# Patient Record
Sex: MALE | Race: WHITE | Employment: OTHER | URBAN - METROPOLITAN AREA
[De-identification: names, ages, dates, MRNs, and addresses within clinical notes are randomized per-mention and may not be internally consistent; named-entity substitution may affect disease eponyms.]

---

## 2019-11-19 ENCOUNTER — HOME HEALTH ADMISSION (OUTPATIENT)
Dept: HOME HEALTH SERVICES | Facility: HOME HEALTH | Age: 54
End: 2019-11-19
Payer: MEDICARE

## 2019-11-19 ENCOUNTER — HOSPITAL ENCOUNTER (OUTPATIENT)
Dept: PHYSICAL THERAPY | Age: 54
Discharge: HOME | End: 2019-11-19
Payer: MEDICARE

## 2019-11-19 ENCOUNTER — HOSPITAL ENCOUNTER (OUTPATIENT)
Dept: SURGERY | Age: 54
Discharge: HOME | End: 2019-11-19
Payer: MEDICARE

## 2019-11-19 LAB
ANION GAP SERPL CALC-SCNC: 2 MMOL/L (ref 7–16)
APTT PPP: 29.7 SEC (ref 24.7–39.8)
BACTERIA SPEC CULT: ABNORMAL
BASOPHILS # BLD: 0.1 K/UL (ref 0–0.2)
BASOPHILS NFR BLD: 1 % (ref 0–2)
BUN SERPL-MCNC: 22 MG/DL (ref 6–23)
CALCIUM SERPL-MCNC: 9.5 MG/DL (ref 8.3–10.4)
CHLORIDE SERPL-SCNC: 104 MMOL/L (ref 98–107)
CO2 SERPL-SCNC: 33 MMOL/L (ref 21–32)
CREAT SERPL-MCNC: 0.83 MG/DL (ref 0.8–1.5)
DIFFERENTIAL METHOD BLD: NORMAL
EOSINOPHIL # BLD: 0.3 K/UL (ref 0–0.8)
EOSINOPHIL NFR BLD: 5 % (ref 0.5–7.8)
ERYTHROCYTE [DISTWIDTH] IN BLOOD BY AUTOMATED COUNT: 13.1 % (ref 11.9–14.6)
EST. AVERAGE GLUCOSE BLD GHB EST-MCNC: 117 MG/DL
GLUCOSE SERPL-MCNC: 102 MG/DL (ref 65–100)
HBA1C MFR BLD: 5.7 %
HCT VFR BLD AUTO: 44.6 % (ref 41.1–50.3)
HGB BLD-MCNC: 14.3 G/DL (ref 13.6–17.2)
IMM GRANULOCYTES # BLD AUTO: 0 K/UL (ref 0–0.5)
IMM GRANULOCYTES NFR BLD AUTO: 0 % (ref 0–5)
INR PPP: 0.9
LYMPHOCYTES # BLD: 2.2 K/UL (ref 0.5–4.6)
LYMPHOCYTES NFR BLD: 33 % (ref 13–44)
MCH RBC QN AUTO: 28.7 PG (ref 26.1–32.9)
MCHC RBC AUTO-ENTMCNC: 32.1 G/DL (ref 31.4–35)
MCV RBC AUTO: 89.4 FL (ref 79.6–97.8)
MONOCYTES # BLD: 0.6 K/UL (ref 0.1–1.3)
MONOCYTES NFR BLD: 8 % (ref 4–12)
NEUTS SEG # BLD: 3.5 K/UL (ref 1.7–8.2)
NEUTS SEG NFR BLD: 52 % (ref 43–78)
NRBC # BLD: 0 K/UL (ref 0–0.2)
PLATELET # BLD AUTO: 205 K/UL (ref 150–450)
PMV BLD AUTO: 10.9 FL (ref 9.4–12.3)
POTASSIUM SERPL-SCNC: 4.4 MMOL/L (ref 3.5–5.1)
PROTHROMBIN TIME: 12.1 SEC (ref 11.7–14.5)
RBC # BLD AUTO: 4.99 M/UL (ref 4.23–5.6)
SERVICE CMNT-IMP: ABNORMAL
SODIUM SERPL-SCNC: 139 MMOL/L (ref 136–145)
WBC # BLD AUTO: 6.7 K/UL (ref 4.3–11.1)

## 2019-11-19 PROCEDURE — 85730 THROMBOPLASTIN TIME PARTIAL: CPT

## 2019-11-19 PROCEDURE — 85610 PROTHROMBIN TIME: CPT

## 2019-11-19 PROCEDURE — 85025 COMPLETE CBC W/AUTO DIFF WBC: CPT

## 2019-11-19 PROCEDURE — 80048 BASIC METABOLIC PNL TOTAL CA: CPT

## 2019-11-19 PROCEDURE — 97161 PT EVAL LOW COMPLEX 20 MIN: CPT

## 2019-11-19 PROCEDURE — 36415 COLL VENOUS BLD VENIPUNCTURE: CPT

## 2019-11-19 PROCEDURE — 87641 MR-STAPH DNA AMP PROBE: CPT

## 2019-11-19 PROCEDURE — 83036 HEMOGLOBIN GLYCOSYLATED A1C: CPT

## 2019-11-19 PROCEDURE — 77030027138 HC INCENT SPIROMETER -A

## 2019-11-19 RX ORDER — METHADONE HYDROCHLORIDE 10 MG/1
10 TABLET ORAL 3 TIMES DAILY
COMMUNITY

## 2019-11-19 RX ORDER — CELECOXIB 200 MG/1
200 CAPSULE ORAL DAILY
COMMUNITY

## 2019-11-19 NOTE — PERIOP NOTES
Patient verified name and . Order for consent  found in EHR and matches case posting; patient verified. Anesthesia review EKG;  bradycardia. Pt reports hx of lumbar fusion and revision in , : reports he has x-rays and will bring for anesthesiologist to review on DOS. Type 3 surgery, Joint assessment complete. Labs per surgeon: cbc,bmp,pt,ptt ; results within anesthesia limits; mssa pending. Labs per anesthesia protocol: no other labs needed. EKG: dated 19: anesthesia to review along with  prior ekg dated 2018; also requested most recent office note of PCP for review. MRSA/MSSA swab collected; pharmacy to review and dose antibiotic as appropriate. Hospital approved surgical skin cleanser and instructions to return bottle on DOS given per hospital policy. Patient provided with handouts including Guide to Surgery, Pain Management, Hand Hygiene, Blood Transfusion Education, and [place] [facility] Brochure. Patient answered medical/surgical history questions at their best of ability. All prior to admission medications documented in [facility]. Original medication prescription bottle not visualized during patient appointment. Patient given copy of  medication record with instructions-see prior NN. Patient teach back successful and patient demonstrates knowledge of instruction.

## 2019-11-19 NOTE — PERIOP NOTES
Received from PCP: EKGs  dated 6-6-19 and 4-23-18 along with office note dated 6-6-19: anesthesia review.

## 2019-11-19 NOTE — PROGRESS NOTES
Lucita Erwin  : [birth year](67 y.o.) [address] at [place] [address].  [phone]       Physical Therapy Prehab Plan of Treatment and Evaluation Summary:2019    ICD-10: Treatment Diagnosis:   · Pain in Right Knee (M25.561)  · Stiffness of Right Knee, Not elsewhere classified (M25.661)  Precautions/Allergies:   Patient has no allergy information on record. MEDICAL/REFERRING DIAGNOSIS:  Unilateral primary osteoarthritis, right knee [M17.11]  REFERRING PHYSICIAN: Ethan Soares MD  DATE OF SURGERY: 19    Assessment:   Comments:  He is here alone. He lpans on going to his girlfriend's house at [facility] for her support. He should do well. Very motivated. PROBLEM LIST (Impacting functional limitations):  Mr. Sharron Cameron presents with the following right lower extremity(s) problems:  1. Strength  2. Range of Motion  3. Home Exercise Program  4. Pain   INTERVENTIONS PLANNED:  1. Home Exercise Program  2. Educational Discussion      TREATMENT PLAN: Effective Dates: 2019 TO 2019. Frequency/Duration: Patient to continue to perform home exercise program at least twice per day up until his surgery. GOALS: (Goals have been discussed and agreed upon with patient.)  Discharge Goals: Time Frame: 1 Day  1. Patient will demonstrate independence with a home exercise program designed to increase strength, range of motion and pain control to minimize functional deficits and optimize patient for total joint replacement. Rehabilitation Potential For Stated Goals: Good  Regarding Nazia Vazquez's therapy, I certify that the treatment plan above will be carried out by a therapist or under their direction.   Thank you for this referral,  Sarah Oshea, PT               HISTORY:   Present Symptoms:  Pain Intensity 1: 8(at its worst)  Pain Location 1: Knee  Pain Orientation 1: Right, Medial, Anterior   History of Present Injury/Illness (Reason for Referral):  Medical/Referring Diagnosis: Unilateral primary osteoarthritis, right knee [M17.11]   Past Medical History/Comorbidities:   Mr. Betty Bradford  has no past medical history on file. Mr. Betty Bradford  has no past surgical history on file. Social History/Living Environment:   Home Environment: Private residence  # Steps to Enter: 3  Rails to Enter: No  One/Two Story Residence: One story  Living Alone: Yes  Support Systems: (girlfriend)  Patient Expects to be Discharged to[de-identified] Private residence  Current DME Used/Available at Home: Francisco J Barer, straight, Walker, rolling, Shower chair, Commode, bedside  Tub or Shower Type: Shower  Work/Activity:  disabled  Dominant Side:  LEFT  Current Medications:  See Pre-assessment nursing note   Number of Personal Factors/Comorbidities that affect the Plan of Care: 1-2: MODERATE COMPLEXITY   EXAMINATION:   ADLs (Current Functional Status):   Ambulation:  [x] Independent  [] Walk Indoors Only  [x] Walk Outdoors  [] Use Assistive Device  [] Use Wheelchair Only Dressing:  [x] Independent  Requires Assistance from Someone for:  [] Sock/Shoes  [] Pants  [] Everything   Bathing/Showering:   [x] Independent  [] Requires Assistance from Someone  [] [address] Only Household Activities:  [x] Routine house and yard work  [] Light Housework Only  [] None   Observation/Orthostatic Postural Assessment:    Genu varus left, Genu varus right, Leg length discrepancy, right(R LE 1/4\" shorter than L)  ROM/Flexibility:   AROM: Within functional limits(L LE)                       RLE AROM  R Knee Flexion: 112  R Knee Extension: 10   Strength:   Strength:  Within functional limits(L LE)              RLE Strength  R Hip Flexion: 5  R Knee Extension: 4  R Ankle Dorsiflexion: 5   Functional Mobility:    Sensation: Intact(L LE)    Stand to Sit: Independent  Sit to Stand: Independent  Stand Pivot Transfers: Independent  Distance (ft): 1000 Feet (ft)  Ambulation - Level of Assistance: Independent  Speed/Jordana: City[employer] decreased (<100 feet/min)  Gait Abnormalities: Antalgic          Balance:    Sitting: Intact  Standing: Intact   Body Structures Involved:  1. Bones  2. Joints  3. Muscles Body Functions Affected:  1. Neuromusculoskeletal  2. Movement Related Activities and Participation Affected:  1. General Tasks and Demands  2. Mobility   Number of elements that affect the Plan of Care: 4+: HIGH COMPLEXITY   CLINICAL PRESENTATION:   Presentation: Stable and uncomplicated: LOW COMPLEXITY   CLINICAL DECISION MAKING:   Outcome Measure: Tool Used: Lower Extremity Functional Scale (LEFS)  Score:  Initial: 52/80 Most Recent: X/80 (Date: -- )   Interpretation of Score: 20 questions each scored on a 5 point scale with 0 representing \"extreme difficulty or unable to perform\" and 4 representing \"no difficulty\". The lower the score, the greater the functional disability. 80/80 represents no disability. Minimal detectable change is 9 points. Medical Necessity:   · Mr. Angel Garcia is expected to optimize his lower extremity strength and ROM in preparation for joint replacement surgery. Reason for Services/Other Comments:  · Achieve baseline assesment of musculoskeletal system, functional mobility and home environment. , educate in PT HEP in preparation for surgery, educate in hospital plan of care. Use of outcome tool(s) and clinical judgement create a POC that gives a: Clear prediction of patient's progress: LOW COMPLEXITY   TREATMENT:   Treatment/Session Assessment:  Patient was instructed in PT- HEP to increase strength and ROM in LEs. Answered all questions. · Post session pain:  2  · Compliance with Program/Exercises: compliant most of the time.   Total Treatment Duration:  PT Patient Time In/Time Out  Time In: 0800  Time Out: 0815    Austin Roberson PT

## 2019-11-19 NOTE — PERIOP NOTES
PLEASE CONTINUE TAKING ALL PRESCRIPTION MEDICATIONS UP TO THE DAY OF SURGERY. DISCONTINUE all vitamins and supplements 7 days prior to surgery. Home Medications to take  the day of surgery      Methadone           Home Medications   to Hold     NONE           Comments                Please do not bring home medications with you on the day of surgery unless otherwise directed by your nurse. If you are instructed to bring home medications, please give them to your nurse as they will be administered by the nursing staff. If you have any questions, [facility] [phone] or [facility] [phone].

## 2019-11-20 VITALS
SYSTOLIC BLOOD PRESSURE: 152 MMHG | OXYGEN SATURATION: 100 % | HEIGHT: 69 IN | RESPIRATION RATE: 18 BRPM | BODY MASS INDEX: 26.36 KG/M2 | TEMPERATURE: 97.4 F | HEART RATE: 44 BPM | WEIGHT: 178 LBS | DIASTOLIC BLOOD PRESSURE: 95 MMHG

## 2019-11-20 NOTE — PERIOP NOTES
Dr. Jesusita Flores, anesthesia, reviewed and ok'd for surgery ekg's 6/6/19, 4/23/18 and pc note 6/6/19.

## 2019-11-20 NOTE — PROGRESS NOTES
19 0730   Oxygen Therapy   O2 Sat (%) 98 %   Pulse via Oximetry 58 beats per minute   O2 Device Room air   Pre-Treatment   Breath Sounds Bilateral Clear   Pre FEV1 (liters) 3.4 liters   % Predicted 104   Incentive Spirometry Treatment   Actual Volume (ml) 4000 ml     Initial respiratory Assessment completed with pt. Pt was interviewed and evaluated in Joint camp prior to surgery. Patient ID:  Mohan Hernandez  [id number]  71 y.o.  1965  Surgeon: Dr. Shayla Dodge  Date of Surgery: 2019  Procedure: Total Right Knee Arthroplasty  Primary Care Physician: Hollis Collazo [phone]  Specialists:                                  Pt instructed in the use of Incentive Spirometry. Pt instructed to bring Incentive Spirometer back on date of surgery & to start using Is upon return to pt room.     Pt taught proper cough technique    History of smokin/2 PPD FOR 30 YEARS                                                      Quit date:       10 YEARS AGO    Secondhand smoke:FATHER      Past procedures with Oxygen desaturation:DENIES    Past Medical History:   Diagnosis Date    Arthritis     Bradycardia     pt reports his heart rate is usually in the 40s    Chronic pain     s/p back fusion, infection, and and revision  -pt sees pain management physician for control    Kidney stones              PT STATES HE TAKES METHADONE TID FOR PAST 20 YEARS FOR PAIN                                                                                                                                        Respiratory history:DENIES SOB                                                                  Respiratory meds:  DENIES                                       FAMILY PRESENT:                                                             NO                                        PAST SLEEP STUDY:                     DENIES  HX OF CAROLINA:                                      DENIES                                     CAROLINA assessment:                                               SLEEPS ON SIDE       &      BACK                                                         PHYSICAL EXAM   Body mass index is 26.29 kg/m².    Visit Vitals  BP (!) 152/95 (BP 1 Location: Right arm, BP Patient Position: At rest)   Pulse (!) 44   Temp 97.4 °F (36.3 °C)   Resp 18   Ht 5' 9\" (1.753 m)   Wt 80.7 kg (178 lb)   SpO2 100%   BMI 26.29 kg/m²     Neck circumference: 39.5     cm    Loud snoring:        SNORES                                Witnessed apnea or wakening gasping or choking:,           PT STATES POSSIBLE APNEA                                                                                             WAKES UP CHOKED    Awakens with headaches:                                                  DENIES    Morning or daytime tiredness/ sleepiness:                                                                                                         TIRED   Dry mouth or sore throat in morning:                YES                                                                          Granados stage:  3    SACS score:      Stop Bang   STOP-BANG  Does the patient snore loudly (louder than talking or loud enough to be heard through closed doors)?: No  Does the patient often feel tired, fatigued, or sleepy during the daytime, even after a \"good\" night's sleep?: Yes  Has anyone ever observed the patient stop breathing during their sleep? : Yes  Does the patient have or are they being treated for high blood pressure?: No  Is the patient's BMI greater than 35?: No  Is your neck circumference greater than 17 inches (Male) or 16 inches (Female)?: No  Is the patient older than 48?: Yes  Is the patient male?: Yes  CAROLINA Score: 4  Has the patient been referred to Sleep Medicine?: No  Has the patient previously been diagnosed with Obstructive Sleep Apnea?: No                            CPAP:                  NONE               CONT SAT HS Referrals:  DECLINED  Pt.  Phone Number:

## 2019-12-03 NOTE — ADVANCED PRACTICE NURSE
Total Joint Surgery Preoperative Chart Review      Patient ID:  Vivian Frazier  [id number]  10 y.o.  1965  Surgeon: Dr. Bright Torres  Date of Surgery: 12/9/2019  Procedure: Total Right Knee Arthroplasty  Primary Care Physician: Abilio Marcum [phone]  Specialty Physician(s):      Subjective:   Vivian Frazier is a 62 y.o. WHITE OR  male who presents for preoperative evaluation for Total Right Knee arthroplasty. This is a preoperative chart review note based on data collected by the nurse at the surgical Pre-Assessment visit. Past Medical History:   Diagnosis Date    Arthritis     Bradycardia     pt reports his heart rate is usually in the 40s    Chronic pain     s/p back fusion, infection, and and revision  -pt sees pain management physician for control    Kidney stones       Past Surgical History:   Procedure Laterality Date    HX KNEE ARTHROSCOPY  2014    meniscus repair bilateral    HX KNEE REPLACEMENT Left 2016    HX OTHER SURGICAL  2000    spine fusion    HX OTHER SURGICAL  2001    revision spine fusion     History reviewed. No pertinent family history. Social History     Tobacco Use    Smoking status: Former Smoker    Smokeless tobacco: Never Used   Substance Use Topics    Alcohol use: Never     Frequency: Never       Prior to Admission medications    Medication Sig Start Date End Date Taking? Authorizing Provider   celecoxib (CELEBREX) 200 mg capsule Take  by mouth daily. Yes Provider, Historical   methadone (DOLOPHINE) 10 mg tablet Take  by mouth three (3) times daily. Indications: excessive pain   Yes Provider, Historical   ascorbic acid,sod/zinc ox,gluc (ZINC AND C PO) Take  by mouth daily. Yes Provider, Historical   MAGNESIUM CARBONATE by Does Not Apply route daily. Yes Provider, Historical   docusate sodium (STOOL SOFTENER PO) Take  by mouth as needed.    Yes Provider, Historical     No Known Allergies       Objective:     Physical Exam:   No data found.    ECG:    EKG Results     None          Data Review:   Labs:     Results for Luz Reynaga (MRN [id number]) as of 12/3/2019 14:44   Ref.  Range 11/19/2019 07:30   Sodium Latest Ref Range: 136 - 145 mmol/L 139   Potassium Latest Ref Range: 3.5 - 5.1 mmol/L 4.4   Chloride Latest Ref Range: 98 - 107 mmol/L 104   CO2 Latest Ref Range: 21 - 32 mmol/L 33 (H)   Anion gap Latest Ref Range: 7 - 16 mmol/L 2 (L)   Glucose Latest Ref Range: 65 - 100 mg/dL 102 (H)   BUN Latest Ref Range: 6 - 23 MG/DL 22   Creatinine Latest Ref Range: 0.8 - 1.5 MG/DL 0.83   Calcium Latest Ref Range: 8.3 - 10.4 MG/DL 9.5   GFR est non-AA Latest Ref Range: >60 ml/min/1.73m2 >60   GFR est AA Latest Ref Range: >60 ml/min/1.73m2 >60   Hemoglobin A1c, (calculated) Latest Units: % 5.7   Est. average glucose Latest Units: mg/dL 117       Problem List:  )    Total Joint Surgery Pre-Assessment Recommendations:           None    Signed By: SILVIANO Velasquez    December 3, 2019

## 2019-12-03 NOTE — H&P
[address] [facility]  Pre Operative History and Physical Exam    Patient ID:  Tari Garcia  [id number]  17 y.o.  1965    Today: December 3, 2019       Assessment:   1. Arthritis of the right knee  2. Recommendation for post op pain per pain management:  Oxycodone 10-15mg tablets one every 4-6 hours for 5-7 days. Keep taking methadone for chronic pain. Plan for 7 days depending on normal dosing schedule for our post op protocol. Plan:    1. Proceed with scheduled Procedure(s) (LRB):  RIGHT KNEE ARTHROPLASTY TOTAL / FNB / GISELA (Right)            CC:  Right knee pain    HPI:   The patient has end stage arthritis of the right knee. The patient was evaluated and examined during a consultation prior to this office visit. There have been no changes to the patient's orthopedic condition since the initial consultation. The patient has failed previous conservative treatment for this condition including antiinflammatories , and lifestyle modifications. The necessity for joint replacement is present.  The patient will be admitted the day of surgery for Procedure(s) (LRB):  RIGHT KNEE ARTHROPLASTY TOTAL / FNB / GISELA (Right)      Past Medical/Surgical History:  Past Medical History:   Diagnosis Date    Arthritis     Bradycardia     pt reports his heart rate is usually in the 40s    Chronic pain     s/p back fusion, infection, and and revision  -pt sees pain management physician for control    Kidney stones      Past Surgical History:   Procedure Laterality Date    HX KNEE ARTHROSCOPY  2014    meniscus repair bilateral    HX KNEE REPLACEMENT Left 2016    HX OTHER SURGICAL  2000    spine fusion    HX OTHER SURGICAL  2001    revision spine fusion        Allergies: No Known Allergies     Physical Exam:   General: NAD, Alert, Oriented, Appears their stated age     [de-identified]: NC/AT, PERRL    Skin: No rashes, lesions or wounds seen      Psych: normal affect      Heart: Regular Rate, Rhythm     Lungs: unlabored respirations, normal breath sounds     Abdomen: Soft and non-distended     Ortho: Pain with limited ROM of the right knee    Neuro: no focal defects, sensation is equal bilaterally     Lymph: no lymphadenopathy     Meds:   No current facility-administered medications for this encounter. Current Outpatient Medications   Medication Sig    celecoxib (CELEBREX) 200 mg capsule Take  by mouth daily.  methadone (DOLOPHINE) 10 mg tablet Take  by mouth three (3) times daily. Indications: excessive pain    ascorbic acid,sod/zinc ox,gluc (ZINC AND C PO) Take  by mouth daily.  MAGNESIUM CARBONATE by Does Not Apply route daily.  docusate sodium (STOOL SOFTENER PO) Take  by mouth as needed. Labs:  Hospital Outpatient Visit on 11/19/2019   Component Date Value Ref Range Status    WBC 11/19/2019 6.7  4.3 - 11.1 K/uL Final    RBC 11/19/2019 4.99  4.23 - 5.6 M/uL Final    HGB 11/19/2019 14.3  13.6 - 17.2 g/dL Final    HCT 11/19/2019 44.6  41.1 - 50.3 % Final    MCV 11/19/2019 89.4  79.6 - 97.8 FL Final    MCH 11/19/2019 28.7  26.1 - 32.9 PG Final    MCHC 11/19/2019 32.1  31.4 - 35.0 g/dL Final    RDW 11/19/2019 13.1  11.9 - 14.6 % Final    PLATELET 83/20/6371 499  150 - 450 K/uL Final    MPV 11/19/2019 10.9  9.4 - 12.3 FL Final    ABSOLUTE NRBC 11/19/2019 0.00  0.0 - 0.2 K/uL Final    **Note: Absolute NRBC parameter is now reported with Hemogram**    DF 11/19/2019 AUTOMATED    Final    NEUTROPHILS 11/19/2019 52  43 - 78 % Final    LYMPHOCYTES 11/19/2019 33  13 - 44 % Final    MONOCYTES 11/19/2019 8  4.0 - 12.0 % Final    EOSINOPHILS 11/19/2019 5  0.5 - 7.8 % Final    BASOPHILS 11/19/2019 1  0.0 - 2.0 % Final    IMMATURE GRANULOCYTES 11/19/2019 0  0.0 - 5.0 % Final    ABS. NEUTROPHILS 11/19/2019 3.5  1.7 - 8.2 K/UL Final    ABS. LYMPHOCYTES 11/19/2019 2.2  0.5 - 4.6 K/UL Final    ABS. MONOCYTES 11/19/2019 0.6  0.1 - 1.3 K/UL Final    ABS.  EOSINOPHILS 11/19/2019 0.3  0.0 - 0.8 K/UL Final    ABS. BASOPHILS 11/19/2019 0.1  0.0 - 0.2 K/UL Final    ABS. IMM. GRANS. 11/19/2019 0.0  0.0 - 0.5 K/UL Final    Hemoglobin A1c 11/19/2019 5.7  % Final    Est. average glucose 11/19/2019 117  mg/dL Final    Comment: (NOTE)  The eAG should be interpreted with patient characteristics in mind   since ethnicity, interindividual differences, red cell lifespan,   variation in rates of glycation, etc. may affect the validity of the   calculation.  Sodium 11/19/2019 139  136 - 145 mmol/L Final    Potassium 11/19/2019 4.4  3.5 - 5.1 mmol/L Final    Chloride 11/19/2019 104  98 - 107 mmol/L Final    CO2 11/19/2019 33* 21 - 32 mmol/L Final    Anion gap 11/19/2019 2* 7 - 16 mmol/L Final    Glucose 11/19/2019 102* 65 - 100 mg/dL Final    Comment: 47 - 60 mg/dl Consistent with, but not fully diagnostic of hypoglycemia. 101 - 125 mg/dl Impaired fasting glucose/consistent with pre-diabetes mellitus  > 126 mg/dl Fasting glucose consistent with overt diabetes mellitus      BUN 11/19/2019 22  6 - 23 MG/DL Final    Creatinine 11/19/2019 0.83  0.8 - 1.5 MG/DL Final    GFR est AA 11/19/2019 >60  >60 ml/min/1.73m2 Final    GFR est non-AA 11/19/2019 >60  >60 ml/min/1.73m2 Final    Comment: (NOTE)  Estimated GFR is calculated using the Modification of Diet in Renal   Disease (MDRD) Study equation, reported for both  Americans   (GFRAA) and non- Americans (GFRNA), and normalized to 1.73m2   body surface area. The physician must decide which value applies to   the patient. The MDRD study equation should only be used in   individuals age 25 or older. It has not been validated for the   following: pregnant women, patients with serious comorbid conditions,   or on certain medications, or persons with extremes of body size,   muscle mass, or nutritional status.       Calcium 11/19/2019 9.5  8.3 - 10.4 MG/DL Final    Special Requests: 11/19/2019 NASAL    Final    Culture result: 11/19/2019 MRSA target DNA not detected, SA target DNA detected. A MRSA negative, SA positive test result does not preclude MRSA nasal colonization. *   Final    Prothrombin time 11/19/2019 12.1  11.7 - 14.5 sec Final    INR 11/19/2019 0.9    Final    Comment: Suggested therapeutic INR range:  Venous thrombosis and embolus  2.0-3.0  Prosthetic heart valve         2.5-3.5  ** Note new reference range and method **      aPTT 11/19/2019 29.7  24.7 - 39.8 SEC Final    Comment: Heparin Therapeutic Range = 74 - 123 seconds  In addition to factor deficiency, monitoring heparin therapy, etc., evaluation of a prolonged aPTT result should include consideration of preanalytic variables such as heparin flush contamination, specimen integrity issues, etc.                   There is no problem list on file for this patient.         Signed By: Charleen Yuen NP  December 3, 2019

## 2019-12-08 ENCOUNTER — ANESTHESIA EVENT (OUTPATIENT)
Dept: SURGERY | Age: 54
DRG: 470 | End: 2019-12-08
Payer: MEDICARE

## 2019-12-09 ENCOUNTER — ANESTHESIA (OUTPATIENT)
Dept: SURGERY | Age: 54
DRG: 470 | End: 2019-12-09
Payer: MEDICARE

## 2019-12-09 ENCOUNTER — HOSPITAL ENCOUNTER (INPATIENT)
Age: 54
LOS: 1 days | Discharge: HOME HEALTH | DRG: 470 | End: 2019-12-10
Attending: ORTHOPAEDIC SURGERY | Admitting: ORTHOPAEDIC SURGERY
Payer: MEDICARE

## 2019-12-09 DIAGNOSIS — Z96.651 STATUS POST TOTAL KNEE REPLACEMENT, RIGHT: Primary | ICD-10-CM

## 2019-12-09 PROBLEM — M17.11 OSTEOARTHRITIS OF RIGHT KNEE: Status: ACTIVE | Noted: 2019-12-09

## 2019-12-09 PROBLEM — M19.90 OSTEOARTHRITIS: Status: ACTIVE | Noted: 2019-12-09

## 2019-12-09 LAB
ATRIAL RATE: 52 BPM
CALCULATED P AXIS, ECG09: 36 DEGREES
CALCULATED R AXIS, ECG10: 44 DEGREES
CALCULATED T AXIS, ECG11: 30 DEGREES
DIAGNOSIS, 93000: NORMAL
GLUCOSE BLD STRIP.AUTO-MCNC: 93 MG/DL (ref 65–100)
HGB BLD-MCNC: 12.1 G/DL (ref 13.6–17.2)
P-R INTERVAL, ECG05: 142 MS
Q-T INTERVAL, ECG07: 478 MS
QRS DURATION, ECG06: 100 MS
QTC CALCULATION (BEZET), ECG08: 444 MS
VENTRICULAR RATE, ECG03: 52 BPM

## 2019-12-09 PROCEDURE — 85018 HEMOGLOBIN: CPT

## 2019-12-09 PROCEDURE — 77030002966 HC SUT PDS J&J -A: Performed by: ORTHOPAEDIC SURGERY

## 2019-12-09 PROCEDURE — C1713 ANCHOR/SCREW BN/BN,TIS/BN: HCPCS | Performed by: ORTHOPAEDIC SURGERY

## 2019-12-09 PROCEDURE — 93005 ELECTROCARDIOGRAM TRACING: CPT | Performed by: HOSPITALIST

## 2019-12-09 PROCEDURE — 76210000016 HC OR PH I REC 1 TO 1.5 HR: Performed by: ORTHOPAEDIC SURGERY

## 2019-12-09 PROCEDURE — 65270000029 HC RM PRIVATE

## 2019-12-09 PROCEDURE — 74011250637 HC RX REV CODE- 250/637: Performed by: ORTHOPAEDIC SURGERY

## 2019-12-09 PROCEDURE — 8E0Y0CZ ROBOTIC ASSISTED PROCEDURE OF LOWER EXTREMITY, OPEN APPROACH: ICD-10-PCS | Performed by: ORTHOPAEDIC SURGERY

## 2019-12-09 PROCEDURE — 77030010507 HC ADH SKN DERMBND J&J -B: Performed by: ORTHOPAEDIC SURGERY

## 2019-12-09 PROCEDURE — 74011000250 HC RX REV CODE- 250: Performed by: NURSE ANESTHETIST, CERTIFIED REGISTERED

## 2019-12-09 PROCEDURE — 77030019557 HC ELECTRD VES SEAL MEDT -F: Performed by: ORTHOPAEDIC SURGERY

## 2019-12-09 PROCEDURE — 74011250637 HC RX REV CODE- 250/637: Performed by: PHYSICIAN ASSISTANT

## 2019-12-09 PROCEDURE — 82962 GLUCOSE BLOOD TEST: CPT

## 2019-12-09 PROCEDURE — 77030029820: Performed by: ORTHOPAEDIC SURGERY

## 2019-12-09 PROCEDURE — 74011250636 HC RX REV CODE- 250/636: Performed by: ORTHOPAEDIC SURGERY

## 2019-12-09 PROCEDURE — 77030009403 HC ELECTRD ENDO MEGA -B: Performed by: ORTHOPAEDIC SURGERY

## 2019-12-09 PROCEDURE — 77030018836 HC SOL IRR NACL ICUM -A: Performed by: ORTHOPAEDIC SURGERY

## 2019-12-09 PROCEDURE — 76942 ECHO GUIDE FOR BIOPSY: CPT | Performed by: ORTHOPAEDIC SURGERY

## 2019-12-09 PROCEDURE — 97110 THERAPEUTIC EXERCISES: CPT

## 2019-12-09 PROCEDURE — 74011250636 HC RX REV CODE- 250/636: Performed by: PHYSICIAN ASSISTANT

## 2019-12-09 PROCEDURE — 77030039425 HC BLD LARYNG TRULITE DISP TELE -A: Performed by: ANESTHESIOLOGY

## 2019-12-09 PROCEDURE — 97161 PT EVAL LOW COMPLEX 20 MIN: CPT

## 2019-12-09 PROCEDURE — 74011250636 HC RX REV CODE- 250/636: Performed by: ANESTHESIOLOGY

## 2019-12-09 PROCEDURE — 36415 COLL VENOUS BLD VENIPUNCTURE: CPT

## 2019-12-09 PROCEDURE — 76010000171 HC OR TIME 2 TO 2.5 HR INTENSV-TIER 1: Performed by: ORTHOPAEDIC SURGERY

## 2019-12-09 PROCEDURE — 77030031139 HC SUT VCRL2 J&J -A: Performed by: ORTHOPAEDIC SURGERY

## 2019-12-09 PROCEDURE — 77030040922 HC BLNKT HYPOTHRM STRY -A: Performed by: ANESTHESIOLOGY

## 2019-12-09 PROCEDURE — 76010010054 HC POST OP PAIN BLOCK: Performed by: ORTHOPAEDIC SURGERY

## 2019-12-09 PROCEDURE — 0SRC0JZ REPLACEMENT OF RIGHT KNEE JOINT WITH SYNTHETIC SUBSTITUTE, OPEN APPROACH: ICD-10-PCS | Performed by: ORTHOPAEDIC SURGERY

## 2019-12-09 PROCEDURE — 74011000250 HC RX REV CODE- 250: Performed by: ORTHOPAEDIC SURGERY

## 2019-12-09 PROCEDURE — 77030013708 HC HNDPC SUC IRR PULS STRY –B: Performed by: ORTHOPAEDIC SURGERY

## 2019-12-09 PROCEDURE — 76060000035 HC ANESTHESIA 2 TO 2.5 HR: Performed by: ORTHOPAEDIC SURGERY

## 2019-12-09 PROCEDURE — 77030035236 HC SUT PDS STRATFX BARB J&J -B: Performed by: ORTHOPAEDIC SURGERY

## 2019-12-09 PROCEDURE — 77030029828 HC FEM TIB CKPNT KT DISP STRY -B: Performed by: ORTHOPAEDIC SURGERY

## 2019-12-09 PROCEDURE — 74011000258 HC RX REV CODE- 258: Performed by: ORTHOPAEDIC SURGERY

## 2019-12-09 PROCEDURE — 77030012935 HC DRSG AQUACEL BMS -B: Performed by: ORTHOPAEDIC SURGERY

## 2019-12-09 PROCEDURE — 97535 SELF CARE MNGMENT TRAINING: CPT

## 2019-12-09 PROCEDURE — 77030019908 HC STETH ESOPH SIMS -A: Performed by: ANESTHESIOLOGY

## 2019-12-09 PROCEDURE — 77030038149 HC BLD SAW SAG STRY -D: Performed by: ORTHOPAEDIC SURGERY

## 2019-12-09 PROCEDURE — 77030008462 HC STPLR SKN PROX J&J -A: Performed by: ORTHOPAEDIC SURGERY

## 2019-12-09 PROCEDURE — 94760 N-INVAS EAR/PLS OXIMETRY 1: CPT

## 2019-12-09 PROCEDURE — 77030003602 HC NDL NRV BLK BBMI -B: Performed by: ANESTHESIOLOGY

## 2019-12-09 PROCEDURE — 97165 OT EVAL LOW COMPLEX 30 MIN: CPT

## 2019-12-09 PROCEDURE — 77030002912 HC SUT ETHBND J&J -A: Performed by: ORTHOPAEDIC SURGERY

## 2019-12-09 PROCEDURE — C1776 JOINT DEVICE (IMPLANTABLE): HCPCS | Performed by: ORTHOPAEDIC SURGERY

## 2019-12-09 PROCEDURE — 77030006720 HC BLD PAT RMR ZIMM -B: Performed by: ORTHOPAEDIC SURGERY

## 2019-12-09 PROCEDURE — 74011250636 HC RX REV CODE- 250/636: Performed by: NURSE ANESTHETIST, CERTIFIED REGISTERED

## 2019-12-09 PROCEDURE — 77030037088 HC TUBE ENDOTRACH ORAL NSL COVD-A: Performed by: ANESTHESIOLOGY

## 2019-12-09 DEVICE — BASEPLATE TIB SZ 4 AP46MM ML70MM KNEE TRITANIUM 4 CRUCFRM: Type: IMPLANTABLE DEVICE | Site: KNEE | Status: FUNCTIONAL

## 2019-12-09 DEVICE — IMPLANTABLE DEVICE: Type: IMPLANTABLE DEVICE | Site: KNEE | Status: FUNCTIONAL

## 2019-12-09 DEVICE — PAT ASYM MTL-BK 11MM SZ A38 -- TRIATHLON: Type: IMPLANTABLE DEVICE | Site: KNEE | Status: FUNCTIONAL

## 2019-12-09 RX ORDER — OXYCODONE HYDROCHLORIDE 5 MG/1
10 CAPSULE ORAL
Qty: 40 CAP | Refills: 0 | Status: SHIPPED | OUTPATIENT
Start: 2019-12-09 | End: 2019-12-11

## 2019-12-09 RX ORDER — TRANEXAMIC ACID 100 MG/ML
INJECTION, SOLUTION INTRAVENOUS AS NEEDED
Status: DISCONTINUED | OUTPATIENT
Start: 2019-12-09 | End: 2019-12-09 | Stop reason: HOSPADM

## 2019-12-09 RX ORDER — SODIUM CHLORIDE 0.9 % (FLUSH) 0.9 %
5-40 SYRINGE (ML) INJECTION AS NEEDED
Status: DISCONTINUED | OUTPATIENT
Start: 2019-12-09 | End: 2019-12-09 | Stop reason: HOSPADM

## 2019-12-09 RX ORDER — ASPIRIN 81 MG/1
81 TABLET ORAL EVERY 12 HOURS
Status: DISCONTINUED | OUTPATIENT
Start: 2019-12-09 | End: 2019-12-10 | Stop reason: HOSPADM

## 2019-12-09 RX ORDER — FENTANYL CITRATE 50 UG/ML
100 INJECTION, SOLUTION INTRAMUSCULAR; INTRAVENOUS ONCE
Status: COMPLETED | OUTPATIENT
Start: 2019-12-09 | End: 2019-12-09

## 2019-12-09 RX ORDER — SODIUM CHLORIDE, SODIUM LACTATE, POTASSIUM CHLORIDE, CALCIUM CHLORIDE 600; 310; 30; 20 MG/100ML; MG/100ML; MG/100ML; MG/100ML
100 INJECTION, SOLUTION INTRAVENOUS CONTINUOUS
Status: DISCONTINUED | OUTPATIENT
Start: 2019-12-09 | End: 2019-12-09 | Stop reason: HOSPADM

## 2019-12-09 RX ORDER — MIDAZOLAM HYDROCHLORIDE 1 MG/ML
2 INJECTION, SOLUTION INTRAMUSCULAR; INTRAVENOUS ONCE
Status: COMPLETED | OUTPATIENT
Start: 2019-12-09 | End: 2019-12-09

## 2019-12-09 RX ORDER — LIDOCAINE HYDROCHLORIDE 20 MG/ML
INJECTION, SOLUTION EPIDURAL; INFILTRATION; INTRACAUDAL; PERINEURAL AS NEEDED
Status: DISCONTINUED | OUTPATIENT
Start: 2019-12-09 | End: 2019-12-09 | Stop reason: HOSPADM

## 2019-12-09 RX ORDER — OXYCODONE HYDROCHLORIDE 5 MG/1
10 TABLET ORAL
Status: DISCONTINUED | OUTPATIENT
Start: 2019-12-09 | End: 2019-12-09 | Stop reason: HOSPADM

## 2019-12-09 RX ORDER — HYDROMORPHONE HYDROCHLORIDE 2 MG/ML
0.5 INJECTION, SOLUTION INTRAMUSCULAR; INTRAVENOUS; SUBCUTANEOUS
Status: COMPLETED | OUTPATIENT
Start: 2019-12-09 | End: 2019-12-09

## 2019-12-09 RX ORDER — FENTANYL CITRATE 50 UG/ML
INJECTION, SOLUTION INTRAMUSCULAR; INTRAVENOUS AS NEEDED
Status: DISCONTINUED | OUTPATIENT
Start: 2019-12-09 | End: 2019-12-09 | Stop reason: HOSPADM

## 2019-12-09 RX ORDER — OXYCODONE HYDROCHLORIDE 5 MG/1
10 TABLET ORAL
Status: DISCONTINUED | OUTPATIENT
Start: 2019-12-09 | End: 2019-12-10 | Stop reason: HOSPADM

## 2019-12-09 RX ORDER — HYDROMORPHONE HYDROCHLORIDE 2 MG/ML
INJECTION, SOLUTION INTRAMUSCULAR; INTRAVENOUS; SUBCUTANEOUS AS NEEDED
Status: DISCONTINUED | OUTPATIENT
Start: 2019-12-09 | End: 2019-12-09 | Stop reason: HOSPADM

## 2019-12-09 RX ORDER — GLYCOPYRROLATE 0.2 MG/ML
INJECTION INTRAMUSCULAR; INTRAVENOUS AS NEEDED
Status: DISCONTINUED | OUTPATIENT
Start: 2019-12-09 | End: 2019-12-09 | Stop reason: HOSPADM

## 2019-12-09 RX ORDER — CEFAZOLIN SODIUM/WATER 2 G/20 ML
2 SYRINGE (ML) INTRAVENOUS EVERY 8 HOURS
Status: DISPENSED | OUTPATIENT
Start: 2019-12-09 | End: 2019-12-10

## 2019-12-09 RX ORDER — MORPHINE SULFATE 2 MG/ML
4 INJECTION, SOLUTION INTRAMUSCULAR; INTRAVENOUS
Status: DISCONTINUED | OUTPATIENT
Start: 2019-12-09 | End: 2019-12-10 | Stop reason: HOSPADM

## 2019-12-09 RX ORDER — SODIUM CHLORIDE 0.9 % (FLUSH) 0.9 %
5-40 SYRINGE (ML) INJECTION AS NEEDED
Status: DISCONTINUED | OUTPATIENT
Start: 2019-12-09 | End: 2019-12-10 | Stop reason: HOSPADM

## 2019-12-09 RX ORDER — ONDANSETRON 2 MG/ML
INJECTION INTRAMUSCULAR; INTRAVENOUS AS NEEDED
Status: DISCONTINUED | OUTPATIENT
Start: 2019-12-09 | End: 2019-12-09 | Stop reason: HOSPADM

## 2019-12-09 RX ORDER — SODIUM CHLORIDE 0.9 % (FLUSH) 0.9 %
5-40 SYRINGE (ML) INJECTION EVERY 8 HOURS
Status: DISCONTINUED | OUTPATIENT
Start: 2019-12-09 | End: 2019-12-09 | Stop reason: HOSPADM

## 2019-12-09 RX ORDER — EPHEDRINE SULFATE/0.9% NACL/PF 50 MG/5 ML
SYRINGE (ML) INTRAVENOUS AS NEEDED
Status: DISCONTINUED | OUTPATIENT
Start: 2019-12-09 | End: 2019-12-09 | Stop reason: HOSPADM

## 2019-12-09 RX ORDER — DEXAMETHASONE SODIUM PHOSPHATE 4 MG/ML
INJECTION, SOLUTION INTRA-ARTICULAR; INTRALESIONAL; INTRAMUSCULAR; INTRAVENOUS; SOFT TISSUE AS NEEDED
Status: DISCONTINUED | OUTPATIENT
Start: 2019-12-09 | End: 2019-12-09 | Stop reason: HOSPADM

## 2019-12-09 RX ORDER — CELECOXIB 200 MG/1
200 CAPSULE ORAL EVERY 12 HOURS
Status: DISCONTINUED | OUTPATIENT
Start: 2019-12-12 | End: 2019-12-10 | Stop reason: HOSPADM

## 2019-12-09 RX ORDER — DEXAMETHASONE SODIUM PHOSPHATE 100 MG/10ML
10 INJECTION INTRAMUSCULAR; INTRAVENOUS ONCE
Status: DISCONTINUED | OUTPATIENT
Start: 2019-12-10 | End: 2019-12-10 | Stop reason: HOSPADM

## 2019-12-09 RX ORDER — FLUMAZENIL 0.1 MG/ML
0.2 INJECTION INTRAVENOUS
Status: DISCONTINUED | OUTPATIENT
Start: 2019-12-09 | End: 2019-12-09 | Stop reason: HOSPADM

## 2019-12-09 RX ORDER — OXYCODONE HYDROCHLORIDE 5 MG/1
5 TABLET ORAL
Status: DISCONTINUED | OUTPATIENT
Start: 2019-12-09 | End: 2019-12-09 | Stop reason: HOSPADM

## 2019-12-09 RX ORDER — MIDAZOLAM HYDROCHLORIDE 1 MG/ML
2 INJECTION, SOLUTION INTRAMUSCULAR; INTRAVENOUS
Status: DISCONTINUED | OUTPATIENT
Start: 2019-12-09 | End: 2019-12-09 | Stop reason: HOSPADM

## 2019-12-09 RX ORDER — AMOXICILLIN 250 MG
2 CAPSULE ORAL DAILY
Status: DISCONTINUED | OUTPATIENT
Start: 2019-12-10 | End: 2019-12-10 | Stop reason: HOSPADM

## 2019-12-09 RX ORDER — BACITRACIN 50000 [IU]/1
INJECTION, POWDER, FOR SOLUTION INTRAMUSCULAR AS NEEDED
Status: DISCONTINUED | OUTPATIENT
Start: 2019-12-09 | End: 2019-12-09 | Stop reason: HOSPADM

## 2019-12-09 RX ORDER — KETOROLAC TROMETHAMINE 15 MG/ML
15 INJECTION, SOLUTION INTRAMUSCULAR; INTRAVENOUS EVERY 8 HOURS
Status: DISCONTINUED | OUTPATIENT
Start: 2019-12-09 | End: 2019-12-10 | Stop reason: HOSPADM

## 2019-12-09 RX ORDER — ACETAMINOPHEN 10 MG/ML
INJECTION, SOLUTION INTRAVENOUS AS NEEDED
Status: DISCONTINUED | OUTPATIENT
Start: 2019-12-09 | End: 2019-12-09 | Stop reason: HOSPADM

## 2019-12-09 RX ORDER — ACETAMINOPHEN 10 MG/ML
1000 INJECTION, SOLUTION INTRAVENOUS ONCE
Status: DISCONTINUED | OUTPATIENT
Start: 2019-12-09 | End: 2019-12-09

## 2019-12-09 RX ORDER — PROPOFOL 10 MG/ML
INJECTION, EMULSION INTRAVENOUS AS NEEDED
Status: DISCONTINUED | OUTPATIENT
Start: 2019-12-09 | End: 2019-12-09 | Stop reason: HOSPADM

## 2019-12-09 RX ORDER — ACETAMINOPHEN 500 MG
1000 TABLET ORAL ONCE
Status: DISCONTINUED | OUTPATIENT
Start: 2019-12-09 | End: 2019-12-09 | Stop reason: HOSPADM

## 2019-12-09 RX ORDER — LIDOCAINE HYDROCHLORIDE 10 MG/ML
0.1 INJECTION INFILTRATION; PERINEURAL AS NEEDED
Status: DISCONTINUED | OUTPATIENT
Start: 2019-12-09 | End: 2019-12-09 | Stop reason: HOSPADM

## 2019-12-09 RX ORDER — CELECOXIB 200 MG/1
200 CAPSULE ORAL EVERY 12 HOURS
Status: DISCONTINUED | OUTPATIENT
Start: 2019-12-09 | End: 2019-12-09

## 2019-12-09 RX ORDER — METHADONE HYDROCHLORIDE 10 MG/1
10 TABLET ORAL 3 TIMES DAILY
Status: DISCONTINUED | OUTPATIENT
Start: 2019-12-09 | End: 2019-12-10 | Stop reason: HOSPADM

## 2019-12-09 RX ORDER — ACETAMINOPHEN 500 MG
1000 TABLET ORAL EVERY 6 HOURS
Status: DISCONTINUED | OUTPATIENT
Start: 2019-12-10 | End: 2019-12-10 | Stop reason: HOSPADM

## 2019-12-09 RX ORDER — NALOXONE HYDROCHLORIDE 0.4 MG/ML
.2-.4 INJECTION, SOLUTION INTRAMUSCULAR; INTRAVENOUS; SUBCUTANEOUS
Status: DISCONTINUED | OUTPATIENT
Start: 2019-12-09 | End: 2019-12-10 | Stop reason: HOSPADM

## 2019-12-09 RX ORDER — DIPHENHYDRAMINE HYDROCHLORIDE 50 MG/ML
12.5 INJECTION, SOLUTION INTRAMUSCULAR; INTRAVENOUS
Status: DISCONTINUED | OUTPATIENT
Start: 2019-12-09 | End: 2019-12-09 | Stop reason: HOSPADM

## 2019-12-09 RX ORDER — NEOSTIGMINE METHYLSULFATE 1 MG/ML
INJECTION, SOLUTION INTRAVENOUS AS NEEDED
Status: DISCONTINUED | OUTPATIENT
Start: 2019-12-09 | End: 2019-12-09 | Stop reason: HOSPADM

## 2019-12-09 RX ORDER — ONDANSETRON 2 MG/ML
4 INJECTION INTRAMUSCULAR; INTRAVENOUS
Status: DISCONTINUED | OUTPATIENT
Start: 2019-12-09 | End: 2019-12-10 | Stop reason: HOSPADM

## 2019-12-09 RX ORDER — SODIUM CHLORIDE 9 MG/ML
100 INJECTION, SOLUTION INTRAVENOUS CONTINUOUS
Status: DISCONTINUED | OUTPATIENT
Start: 2019-12-09 | End: 2019-12-10 | Stop reason: HOSPADM

## 2019-12-09 RX ORDER — SODIUM CHLORIDE 0.9 % (FLUSH) 0.9 %
5-40 SYRINGE (ML) INJECTION EVERY 8 HOURS
Status: DISCONTINUED | OUTPATIENT
Start: 2019-12-09 | End: 2019-12-10 | Stop reason: HOSPADM

## 2019-12-09 RX ORDER — DIPHENHYDRAMINE HCL 25 MG
25 CAPSULE ORAL
Status: DISCONTINUED | OUTPATIENT
Start: 2019-12-09 | End: 2019-12-10 | Stop reason: HOSPADM

## 2019-12-09 RX ORDER — NALOXONE HYDROCHLORIDE 0.4 MG/ML
0.2 INJECTION, SOLUTION INTRAMUSCULAR; INTRAVENOUS; SUBCUTANEOUS AS NEEDED
Status: DISCONTINUED | OUTPATIENT
Start: 2019-12-09 | End: 2019-12-09 | Stop reason: HOSPADM

## 2019-12-09 RX ORDER — ROPIVACAINE HYDROCHLORIDE 2 MG/ML
INJECTION, SOLUTION EPIDURAL; INFILTRATION; PERINEURAL AS NEEDED
Status: DISCONTINUED | OUTPATIENT
Start: 2019-12-09 | End: 2019-12-09 | Stop reason: HOSPADM

## 2019-12-09 RX ORDER — ROCURONIUM BROMIDE 10 MG/ML
INJECTION, SOLUTION INTRAVENOUS AS NEEDED
Status: DISCONTINUED | OUTPATIENT
Start: 2019-12-09 | End: 2019-12-09 | Stop reason: HOSPADM

## 2019-12-09 RX ORDER — ASPIRIN 81 MG/1
81 TABLET ORAL EVERY 12 HOURS
Qty: 60 TAB | Refills: 1 | Status: SHIPPED | OUTPATIENT
Start: 2019-12-09 | End: 2020-01-13

## 2019-12-09 RX ORDER — CEFAZOLIN SODIUM/WATER 2 G/20 ML
2 SYRINGE (ML) INTRAVENOUS ONCE
Status: COMPLETED | OUTPATIENT
Start: 2019-12-09 | End: 2019-12-09

## 2019-12-09 RX ORDER — KETOROLAC TROMETHAMINE 30 MG/ML
INJECTION, SOLUTION INTRAMUSCULAR; INTRAVENOUS AS NEEDED
Status: DISCONTINUED | OUTPATIENT
Start: 2019-12-09 | End: 2019-12-09 | Stop reason: HOSPADM

## 2019-12-09 RX ADMIN — LIDOCAINE HYDROCHLORIDE 100 MG: 20 INJECTION, SOLUTION EPIDURAL; INFILTRATION; INTRACAUDAL; PERINEURAL at 07:44

## 2019-12-09 RX ADMIN — Medication 20 MG: at 07:49

## 2019-12-09 RX ADMIN — OXYCODONE HYDROCHLORIDE 10 MG: 5 TABLET ORAL at 16:31

## 2019-12-09 RX ADMIN — Medication 10 ML: at 15:32

## 2019-12-09 RX ADMIN — GLYCOPYRROLATE 0.2 MG: 0.2 INJECTION, SOLUTION INTRAMUSCULAR; INTRAVENOUS at 08:06

## 2019-12-09 RX ADMIN — HYDROMORPHONE HYDROCHLORIDE 0.5 MG: 2 INJECTION INTRAMUSCULAR; INTRAVENOUS; SUBCUTANEOUS at 10:13

## 2019-12-09 RX ADMIN — ONDANSETRON 4 MG: 2 INJECTION INTRAMUSCULAR; INTRAVENOUS at 08:30

## 2019-12-09 RX ADMIN — KETOROLAC TROMETHAMINE 15 MG: 15 INJECTION, SOLUTION INTRAMUSCULAR; INTRAVENOUS at 18:34

## 2019-12-09 RX ADMIN — DEXAMETHASONE SODIUM PHOSPHATE 5 MG: 4 INJECTION, SOLUTION INTRAMUSCULAR; INTRAVENOUS at 08:29

## 2019-12-09 RX ADMIN — Medication 1 AMPULE: at 20:57

## 2019-12-09 RX ADMIN — HYDROMORPHONE HYDROCHLORIDE 0.2 MG: 2 INJECTION INTRAMUSCULAR; INTRAVENOUS; SUBCUTANEOUS at 09:54

## 2019-12-09 RX ADMIN — HYDROMORPHONE HYDROCHLORIDE 0.5 MG: 2 INJECTION INTRAMUSCULAR; INTRAVENOUS; SUBCUTANEOUS at 08:47

## 2019-12-09 RX ADMIN — Medication 2 G: at 07:48

## 2019-12-09 RX ADMIN — PROPOFOL 200 MG: 10 INJECTION, EMULSION INTRAVENOUS at 07:44

## 2019-12-09 RX ADMIN — HYDROMORPHONE HYDROCHLORIDE 0.5 MG: 2 INJECTION INTRAMUSCULAR; INTRAVENOUS; SUBCUTANEOUS at 08:20

## 2019-12-09 RX ADMIN — HYDROMORPHONE HYDROCHLORIDE 0.5 MG: 2 INJECTION INTRAMUSCULAR; INTRAVENOUS; SUBCUTANEOUS at 10:18

## 2019-12-09 RX ADMIN — HYDROMORPHONE HYDROCHLORIDE 0.5 MG: 2 INJECTION INTRAMUSCULAR; INTRAVENOUS; SUBCUTANEOUS at 10:23

## 2019-12-09 RX ADMIN — SODIUM CHLORIDE, SODIUM LACTATE, POTASSIUM CHLORIDE, AND CALCIUM CHLORIDE: 600; 310; 30; 20 INJECTION, SOLUTION INTRAVENOUS at 08:53

## 2019-12-09 RX ADMIN — Medication 10 ML: at 21:00

## 2019-12-09 RX ADMIN — METHADONE HYDROCHLORIDE 10 MG: 10 TABLET ORAL at 14:49

## 2019-12-09 RX ADMIN — SODIUM CHLORIDE, SODIUM LACTATE, POTASSIUM CHLORIDE, AND CALCIUM CHLORIDE 100 ML/HR: 600; 310; 30; 20 INJECTION, SOLUTION INTRAVENOUS at 06:01

## 2019-12-09 RX ADMIN — Medication 2 G: at 15:26

## 2019-12-09 RX ADMIN — METHADONE HYDROCHLORIDE 10 MG: 10 TABLET ORAL at 20:57

## 2019-12-09 RX ADMIN — HYDROMORPHONE HYDROCHLORIDE 0.5 MG: 2 INJECTION INTRAMUSCULAR; INTRAVENOUS; SUBCUTANEOUS at 10:28

## 2019-12-09 RX ADMIN — HYDROMORPHONE HYDROCHLORIDE 0.5 MG: 2 INJECTION INTRAMUSCULAR; INTRAVENOUS; SUBCUTANEOUS at 08:15

## 2019-12-09 RX ADMIN — OXYCODONE HYDROCHLORIDE 10 MG: 5 TABLET ORAL at 20:06

## 2019-12-09 RX ADMIN — MORPHINE SULFATE 4 MG: 2 INJECTION, SOLUTION INTRAMUSCULAR; INTRAVENOUS at 15:25

## 2019-12-09 RX ADMIN — Medication 10 ML: at 14:00

## 2019-12-09 RX ADMIN — OXYCODONE HYDROCHLORIDE 10 MG: 5 TABLET ORAL at 11:43

## 2019-12-09 RX ADMIN — ACETAMINOPHEN 1000 MG: 10 INJECTION, SOLUTION INTRAVENOUS at 09:17

## 2019-12-09 RX ADMIN — MIDAZOLAM 2 MG: 1 INJECTION INTRAMUSCULAR; INTRAVENOUS at 06:58

## 2019-12-09 RX ADMIN — ASPIRIN 81 MG: 81 TABLET ORAL at 20:57

## 2019-12-09 RX ADMIN — GLYCOPYRROLATE 0.4 MG: 0.2 INJECTION, SOLUTION INTRAMUSCULAR; INTRAVENOUS at 09:40

## 2019-12-09 RX ADMIN — DIPHENHYDRAMINE HYDROCHLORIDE 25 MG: 25 CAPSULE ORAL at 18:34

## 2019-12-09 RX ADMIN — FENTANYL CITRATE 100 MCG: 50 INJECTION INTRAMUSCULAR; INTRAVENOUS at 07:44

## 2019-12-09 RX ADMIN — TRANEXAMIC ACID 1 G: 100 INJECTION, SOLUTION INTRAVENOUS at 08:00

## 2019-12-09 RX ADMIN — FENTANYL CITRATE 100 MCG: 50 INJECTION INTRAMUSCULAR; INTRAVENOUS at 06:58

## 2019-12-09 RX ADMIN — Medication 3 AMPULE: at 06:00

## 2019-12-09 RX ADMIN — ROCURONIUM BROMIDE 40 MG: 10 INJECTION, SOLUTION INTRAVENOUS at 07:44

## 2019-12-09 RX ADMIN — HYDROMORPHONE HYDROCHLORIDE 0.1 MG: 2 INJECTION INTRAMUSCULAR; INTRAVENOUS; SUBCUTANEOUS at 09:07

## 2019-12-09 RX ADMIN — Medication 3 MG: at 09:40

## 2019-12-09 RX ADMIN — DIPHENHYDRAMINE HYDROCHLORIDE 25 MG: 25 CAPSULE ORAL at 15:38

## 2019-12-09 RX ADMIN — KETOROLAC TROMETHAMINE 15 MG: 15 INJECTION, SOLUTION INTRAMUSCULAR; INTRAVENOUS at 23:48

## 2019-12-09 RX ADMIN — HYDROMORPHONE HYDROCHLORIDE 0.2 MG: 2 INJECTION INTRAMUSCULAR; INTRAVENOUS; SUBCUTANEOUS at 09:59

## 2019-12-09 NOTE — PROGRESS NOTES
Problem: Mobility Impaired (Adult and Pediatric)  Goal: *Acute Goals and Plan of Care (Insert Text)  Description  GOALS (1-4 days):  (1.)Mr. Yfn Hyman will move from supine to sit and sit to supine  in bed with INDEPENDENT. (2.)Mr. Yfn Hyman will transfer from bed to chair and chair to bed with SUPERVISION using the least restrictive device. (3.)Mr. Yfn Hyman will ambulate with SUPERVISION for 200 feet with the least restrictive device. (4.)Mr. Yfn Hyman will ambulate up/down 3 steps with right railing with STAND BY ASSIST with no device. (5.)Mr. Yfn Hyman will increase right knee ROM to 5°-80°.  ________________________________________________________________________________________________   Outcome: Progressing Towards Goal     PHYSICAL THERAPY JOINT CAMP TKA: Initial Assessment, Treatment Day: Day of Assessment, and PM 12/9/2019  INPATIENT: Hospital Day: 1  Payor: Thad Oquendo / Plan: [facility] MEDICARE CHOICE PPO/PFFS / Product Type: Kids Note Care Medicare /      NAME/AGE/GENDER: Lauryn Joseph is a 62 y.o. male   PRIMARY DIAGNOSIS:  Primary osteoarthritis of right knee [M17.11]   Procedure(s) and Anesthesia Type:     * GEMINI/ RIGHT KNEE ARTHROPLASTY TOTAL / - General (Right)  ICD-10: Treatment Diagnosis:    Pain in Right Hip (M25.551)  Stiffness of Right Hip, Not elsewhere classified (M25.651)  Difficulty in walking, Not elsewhere classified (R26.2)      ASSESSMENT:     Mr. Yfn Hyman presents with impaired strength & mobility s/p right TKA. This pt had decreased stability during out of bed activity. This pt will benefit from follow up therapy to help restore safe function prior to returning home with caregiver.     This section established at most recent assessment   PROBLEM LIST (Impairments causing functional limitations):  Decreased Strength  Decreased ADL/Functional Activities  Decreased Transfer Abilities  Decreased Ambulation Ability/Technique  Decreased Balance  Increased Pain  Decreased Activity Tolerance  Decreased Flexibility/Joint Mobility  Decreased [place] with Home Exercise Program   INTERVENTIONS PLANNED: (Benefits and precautions of physical therapy have been discussed with the patient.)  bed mobility  gait training  home exercise program (HEP)  Range of Motion: active/assisted/passive  Therapeutic Activities  therapeutic exercise/strengthening  transfer training  Group Therapy     TREATMENT PLAN: Frequency/Duration: Follow patient BID for duration of hospital stay to address above goals. Rehabilitation Potential For Stated Goals: Good     RECOMMENDED REHABILITATION/EQUIPMENT: (at time of discharge pending progress): Continue Skilled Therapy and Home Health: Physical Therapy. HISTORY:   History of Present Injury/Illness (Reason for Referral):  Right TKA  Past Medical History/Comorbidities:   Mr. Zach Painter  has a past medical history of Arthritis, Bradycardia, Chronic pain, Kidney stones, and Status post total knee replacement, right (12/9/2019). Mr. Zach Painter  has a past surgical history that includes hx knee replacement (Left, 2016); hx knee arthroscopy (2014); hx other surgical (2000); and hx other surgical (2001). Social History/Living Environment:   Home Environment: Private residence  # Steps to Enter: 3  Rails to Enter: Yes  Hand Rails : Right  One/Two Story Residence: One story  Living Alone: No  Support Systems: Spouse/Significant Other/Partner  Patient Expects to be Discharged to[de-identified] Private residence  Current DME Used/Available at Home: Commode, bedside, Shower chair, Walker, rolling  Tub or Shower Type: Shower  Prior Level of Function/Work/Activity:  Pt was independent without an assistive device prior tot his admission   Number of Personal Factors/Comorbidities that affect the Plan of Care: 3+: HIGH COMPLEXITY   EXAMINATION:   Most Recent Physical Functioning:   Gross Assessment: Yes  Gross Assessment  AROM: Within functional limits(left LE)  Strength:  Within functional limits(left LE)  Coordination: Within functional limits(left LE)         RLE PROM  R Knee Flexion: 60(~post op)  R Knee Extension: -10(~post op)           Bed Mobility  Supine to Sit: Contact guard assistance  Sit to Supine: Contact guard assistance  Scooting: Contact guard assistance    Transfers  Sit to Stand: Contact guard assistance  Stand to Sit: Contact guard assistance  Bed to Chair: Contact guard assistance(with walker)    Balance  Sitting: Intact; Without support  Standing: Impaired; With support(walker)              Weight Bearing Status  Right Side Weight Bearing: As tolerated  Distance (ft): 100 Feet (ft)  Ambulation - Level of Assistance: Contact guard assistance  Assistive Device: Walker, rolling  Speed/Jordana: Delayed  Step Length: Left shortened  Stance: Right decreased  Gait Abnormalities: Antalgic;Decreased step clearance        Braces/Orthotics: none    Right Knee Cold  Type: Cryocuff      Body Structures Involved:  Joints  Muscles Body Functions Affected:  Sensory/Pain  Movement Related Activities and Participation Affected:  General Tasks and Demands  Mobility   Number of elements that affect the Plan of Care: 4+: HIGH COMPLEXITY   CLINICAL PRESENTATION:   Presentation: Stable and uncomplicated: LOW COMPLEXITY   CLINICAL DECISION MAKIN[address] [place], [place] [id number] AM-PAC 6 Clicks   Basic Mobility Inpatient Short Form  How much difficulty does the patient currently have. .. Unable A Lot A Little None   1. Turning over in bed (including adjusting bedclothes, sheets and blankets)? [] 1   [] 2   [x] 3   [] 4   2. Sitting down on and standing up from a chair with arms ( e.g., wheelchair, bedside commode, etc.)   [] 1   [] 2   [x] 3   [] 4   3. Moving from lying on back to sitting on the side of the bed? [] 1   [] 2   [x] 3   [] 4   How much help from another person does the patient currently need. .. Total A Lot A Little None   4. Moving to and from a bed to a chair (including a wheelchair)?   [] 1 [] 2   [x] 3   [] 4   5. Need to walk in hospital room? [] 1   [] 2   [x] 3   [] 4   6. Climbing 3-5 steps with a railing? [x] 1   [] 2   [] 3   [] 4   © 2007, Trustees of [address] [place] [address] [address] [address], under license to [employer]. All rights reserved     Score:  Initial: 16 Most Recent: X (Date: -- )    Interpretation of Tool:  Represents activities that are increasingly more difficult (i.e. Bed mobility, Transfers, Gait). Medical Necessity:     Patient is expected to demonstrate progress in   strength, range of motion, balance, coordination, and functional technique   to   decrease assistance required with bed mobility, transfers & gait   . Reason for Services/Other Comments:  Patient continues to require skilled intervention due to   Pt not independent with functional mobility   . Use of outcome tool(s) and clinical judgement create a POC that gives a: Clear prediction of patient's progress: LOW COMPLEXITY            TREATMENT:   (In addition to Assessment/Re-Assessment sessions the following treatments were rendered)     Pre-treatment Symptoms/Complaints:  none  Pain Initial: numeric scale  Pain Intensity 1: 3  Pain Location 1: Knee  Pain Orientation 1: Right  Pain Intervention(s) 1: Ambulation/Increased Activity, Cold pack  Post Session:  3/10     Therapeutic Exercise: (12 Minutes):  Exercises per grid below to improve mobility and dynamic movement of leg - right to improve functional endurance . Required minimal verbal cues to promote proper body alignment and promote proper body mechanics. Progressed range and repetitions as indicated.   Assessment/ 11 min     Date:  12/9 Date:   Date:     ACTIVITY/EXERCISE AM PM AM PM AM PM   GROUP THERAPY  []  []  []  []  []  []   Ankle Pumps  10       Quad Sets  10       Gluteal Sets  10       Hip ABd/ADduction  10       Straight Leg Raises  10       Knee Slides  10       Short Arc Quads  10       Long Arc Quads         Chair Slides                  B = bilateral; AA = active assistive; A = active; P = passive      Treatment/Session Assessment:     Response to Treatment:  tolerated well    Education:  [x] Home Exercises  [x] Fall Precautions  []  [] D/C Instruction Review  [] Knee Prosthesis Review  [x] Walker Management/Safety [] Adaptive Equipment as Needed       Interdisciplinary Collaboration:   Registered Nurse    After treatment position/precautions:   Up in chair  Bed/Chair-wheels locked  Caregiver at bedside  Call light within reach  RN notified  Family at bedside    Compliance with Program/Exercises: Will assess as treatment progresses. Recommendations/Intent for next treatment session:  Treatment next visit will focus on increasing Mr. Bonillas independence with bed mobility, transfers, gait training, strength/ROM exercises, modalities for pain, and patient education.       Total Treatment Duration:  PT Patient Time In/Time Out  Time In: 1319  Time Out: 8350 [facility]

## 2019-12-09 NOTE — H&P
The patient has end stage arthritis of the right knee. The patient was see and examined and there are no changes to the patient's orthopedic condition. They have tried conservative treatment for this condition; including antiinflammatories and lifestyle modifications and have failed. The necessity for the joint replacement is still present, and the H&P from the office is still current.  The patient will be admitted today for Procedure(s) (LRB):  GEMINI/ RIGHT KNEE ARTHROPLASTY TOTAL / FNB / April Gardner (Right)

## 2019-12-09 NOTE — PROGRESS NOTES
12/09/19 1401   Oxygen Therapy   O2 Sat (%) 95 %   Pulse via Oximetry 63 beats per minute   O2 Device Room air   O2 Flow Rate (L/min) 0 l/min   Incentive Spirometry Treatment   Actual Volume (ml) 4000 ml   Number of Attempts 1   . No shortness of breath or distress noted. BS are clear b/l.    [facility] notes reviewed- continuous sat with no number ordered HS

## 2019-12-09 NOTE — CONSULTS
Pt is a 62years old male with hx of OA, opiate abuse on methadone, who is currently s/p R TKA and POD # 0    Pt is doing well post op and is hemodynamically stable. VS: /86, HR 42-77, RR 16, T 98.0    RECOMMENDATIONS:    Get a 12 lead EKG to evaluate for bradycardia. Bradycardia present since admission, appears to be chronic. Pt is not on any BB, will avoid any use of BB or AV lewis blocking CCB  Pt is hemodynamically stable with BP in 150-170's  Pt is on chronic methadone, and now getting short acting opiates, likely contributing to resting bradycardia. No medical intervention unless pt is symptomatic. Will need cardiology evaluation if pt does become hypotensive or dizzy/lightheaded/near syncopal event    Post op pain control as per primary team  DVT prophylaxis with ASA 81 mg po q12h  Stool softeners to prevent opioid induced constipation. I will sign off. No active medical issues to be addressed from hospitalist stand point. Please call for any questions as needed. Pt not billed for this visit.

## 2019-12-09 NOTE — PROGRESS NOTES
Problem: Self Care Deficits Care Plan (Adult)  Goal: *Acute Goals and Plan of Care (Insert Text)  Description  GOALS:   DISCHARGE GOALS (in preparation for going home/rehab):  3 days  1. Mr. Angel Garcia will perform one lower body dressing activity with SBA required to demonstrate improved functional mobility and safety. 2.  Mr. Angel Garcia will perform one lower body bathing activity with SBA required to demonstrate improved functional mobility and safety. 3.  Mr. Angel Garcia will perform toileting/toilet transfer with SBA to demonstrate improved functional mobility and safety. 4.  Mr. Angel Garcia will perform shower transfer with SBA to demonstrate improved functional mobility and safety. JOINT CAMP OCCUPATIONAL THERAPY TKA: Initial Assessment and Daily Note 12/9/2019  INPATIENT: Hospital Day: 1  Payor: Jarvis Aquino / Plan: [employer] MEDICARE CHOICE PPO/PFFS / Product Type: Managed Care Medicare /      NAME/AGE/GENDER: Dale García is a 62 y.o. male   PRIMARY DIAGNOSIS:  Primary osteoarthritis of right knee [M17.11]   Procedure(s) and Anesthesia Type:     * GEMINI/ RIGHT KNEE ARTHROPLASTY TOTAL / - General (Right)  ICD-10: Treatment Diagnosis:    Pain in Right Knee (M25.561)  Stiffness of Right Knee, Not elsewhere classified (O16.382)      ASSESSMENT:     Mr. Angel Garcia is s/p Right TKA and presents with decreased weight bearing on R LE and decreased independence with functional mobility and activities of daily living as compared to baseline level of function and safety. Patient would benefit from skilled Occupational Therapy to maximize independence and safety with self-care task and functional mobility. Pt would also benefit from education on adaptive equipment and safety precautions in preparation for going home with assist from GF. L TKA done in 2016 in [place]. Patient able to don underwear at edge of bed with assist. mobilized from bed to recliner to toilet to recliner using a rolling walker.  Should progress well with ADL's tomorrow. This section established at most recent assessment   PROBLEM LIST (Impairments causing functional limitations):  Decreased Strength  Decreased ADL/Functional Activities  Decreased Transfer Abilities  Increased Pain  Increased Fatigue  Decreased Flexibility/Joint Mobility  Decreased Knowledge of Precautions   INTERVENTIONS PLANNED: (Benefits and precautions of occupational therapy have been discussed with the patient.)  Activities of daily living training  Adaptive equipment training  Balance training  Clothing management  Donning&doffing training  Theraputic activity     TREATMENT PLAN: Frequency/Duration: Follow patient 1-2tx to address above goals. Rehabilitation Potential For Stated Goals: Excellent     RECOMMENDED REHABILITATION/EQUIPMENT: (at time of discharge pending progress): Continue Skilled Therapy. OCCUPATIONAL PROFILE AND HISTORY:   History of Present Injury/Illness (Reason for Referral): Pt presents this date s/p (Right) TKA. Past Medical History/Comorbidities:   Mr. Robson Jerez  has a past medical history of Arthritis, Bradycardia, Chronic pain, Kidney stones, and Status post total knee replacement, right (12/9/2019). Mr. Robson Jerez  has a past surgical history that includes hx knee replacement (Left, 2016); hx knee arthroscopy (2014); hx other surgical (2000); and hx other surgical (2001). Social History/Living Environment:   Home Environment: Private residence  Support Systems: Friends \ neighbors  Patient Expects to be Discharged to[de-identified] Other (comment)(or)  Prior Level of Function/Work/Activity:  Independent prior. Number of Personal Factors/Comorbidities that affect the Plan of Care: Brief history (0):  LOW COMPLEXITY   ASSESSMENT OF OCCUPATIONAL PERFORMANCE[de-identified]   Most Recent Physical Functioning:   Balance  Sitting: Intact  Standing: With support                    Coordination  Fine Motor Skills-Upper: Left Intact; Right Intact  Gross Motor Skills-Upper: Left Intact; Right Intact         Mental Status  Neurologic State: Alert  Orientation Level: Oriented X4  Cognition: Appropriate decision making  Perception: Appears intact                Basic ADLs (From Assessment) Complex ADLs (From Assessment)   Basic ADL  Feeding: Independent  Oral Facial Hygiene/Grooming: Setup  Bathing: Minimum assistance  Upper Body Dressing: Setup  Lower Body Dressing: Minimum assistance  Toileting: Contact guard assistance     Grooming/Bathing/Dressing Activities of Daily Living                       Functional Transfers  Toilet Transfer : Contact guard assistance  Shower Transfer: Minimum assistance     Bed/Mat Mobility  Supine to Sit: Contact guard assistance  Sit to Stand: Contact guard assistance  Stand to Sit: Contact guard assistance  Bed to Chair: Contact guard assistance  Scooting: Contact guard assistance         Physical Skills Involved:  Range of Motion  Balance  Strength Cognitive Skills Affected (resulting in the inability to perform in a timely and safe manner):  WFL  Psychosocial Skills Affected:  WFL    Number of elements that affect the Plan of Care: 1-3:  LOW COMPLEXITY   CLINICAL DECISION MAKIN[address] [place] [id number] AM-PAC 6 Clicks   Daily Activity Inpatient Short Form  How much help from another person does the patient currently need. .. Total A Lot A Little None   1. Putting on and taking off regular lower body clothing? [] 1   [x] 2   [] 3   [] 4   2. Bathing (including washing, rinsing, drying)? [] 1   [] 2   [x] 3   [] 4   3. Toileting, which includes using toilet, bedpan or urinal?   [] 1   [] 2   [x] 3   [] 4   4. Putting on and taking off regular upper body clothing? [] 1   [] 2   [] 3   [x] 4   5. Taking care of personal grooming such as brushing teeth? [] 1   [] 2   [] 3   [x] 4   6. Eating meals? [] 1   [] 2   [] 3   [x] 4   © , Trustees of [address] [place] [address] [address] [address], under license to [employer].  All rights reserved     Score: Initial: 20 Most Recent: X (Date: -- )    Interpretation of Tool:  Represents activities that are increasingly more difficult (i.e. Bed mobility, Transfers, Gait). Medical Necessity:     Skilled intervention continues to be required due to Deficits listed abvoe. Reason for Services/Other Comments:  Patient continues to require skilled intervention due to   New TKA   . Use of outcome tool(s) and clinical judgement create a POC that gives a: MODERATE COMPLEXITY            TREATMENT:   (In addition to Assessment/Re-Assessment sessions the following treatments were rendered)     Pre-treatment Symptoms/Complaints:    Pain: Initial:   Pain Intensity 1: 5  Post Session:  5     Self Care: (10): Procedure(s) (per grid) utilized to improve and/or restore self-care/home management as related to dressing, bathing, and toileting. Required minimal verbal and tactile cueing to facilitate activities of daily living skills. Initial evaluation 10 minutes    Treatment/Session Assessment:     Response to Treatment:  Good, sitting up in recliner. Education:  [] Home Exercises  [x] Fall Precautions  [] Hip Precautions [] Going Home Video  [x] Knee/Hip Prosthesis Review  [x] Walker Management/Safety [x] Adaptive Equipment as Needed       Interdisciplinary Collaboration:   Physical Therapist  Occupational Therapist  Registered Nurse    After treatment position/precautions:   Up in chair  Bed/Chair-wheels locked  Caregiver at bedside  Call light within reach  RN notified     Compliance with Program/Exercises: Compliant all of the time, Will assess as treatment progresses. Recommendations/Intent for next treatment session:  Treatment next visit will focus on increasing Mr. Vazquez's independence with bed mobility, transfers, self care, functional mobility, modalities for pain, and patient education.       Total Treatment Duration:  OT Patient Time In/Time Out  Time In: 2730  Time Out: 4[address] [place], [place]

## 2019-12-09 NOTE — ANESTHESIA PROCEDURE NOTES
Peripheral Block    Start time: 12/9/2019 6:58 AM  End time: 12/9/2019 7:01 AM  Performed by: Cleo Hein MD  Authorized by: Cleo Hein MD       Pre-procedure: Indications: at surgeon's request and post-op pain management    Preanesthetic Checklist: patient identified, risks and benefits discussed, site marked, timeout performed, anesthesia consent given and patient being monitored      Block Type:   Block Type:   Adductor canal  Laterality:  Right  Monitoring:  Continuous pulse ox, frequent vital sign checks, heart rate, responsive to questions and oxygen  Injection Technique:  Single shot  Procedures: ultrasound guided    Prep: chlorhexidine    Location:  Mid thigh  Needle Type:  Stimuplex  Needle Gauge:  20 G  Needle Localization:  Anatomical landmarks, infiltration and ultrasound guidance    Assessment:  Number of attempts:  1  Injection Assessment:  Incremental injection every 5 mL, negative aspiration for CSF, local visualized surrounding nerve on ultrasound, negative aspiration for blood, no intravascular symptoms, no paresthesia and ultrasound image on chart  Patient tolerance:  Patient tolerated the procedure well with no immediate complications

## 2019-12-09 NOTE — PROGRESS NOTES
After receiving Ancef 2 gram IV through the Right hand # 20 IV two raised welts appeared within minutes on the right fore arm, patient complained of itching. 25 mg Benadryl given PO. Contacted pharmacy about possible reaction's talked with Mariajose Palacios she recommended changing the antibiotic. Sent message out to Arie Mortensen NP for Dr. Sravani Ann waiting to hear back.

## 2019-12-09 NOTE — PERIOP NOTES
TRANSFER - IN REPORT:    Verbal report received from [facility] RN(name) on [employer] [place]  being received from [facility] [place](unit) for routine progression of care      Report consisted of patients Situation, Background, Assessment and   Recommendations(SBAR). Information from the following report(s) SBAR, Kardex and MAR was reviewed with the receiving nurse. Opportunity for questions and clarification was provided. Assessment completed upon patients arrival to unit and care assumed.

## 2019-12-09 NOTE — PROGRESS NOTES
Patient upset about pain medication scheduled, supervisor contacted DAVIDSON MENDOZA concerning IV pain medication that was not ordered, no extra pain medication ordered as a result. Patient upset about outcome of the pain medication, he asked to speak with Dr. Gianna Rojas, contacted Dr. Gianna Rojas who is doing surgery today, he returned call  Gave verbal order for IV morphine and Toradol.

## 2019-12-09 NOTE — ANESTHESIA PREPROCEDURE EVALUATION
Relevant Problems   No relevant active problems       Anesthetic History   No history of anesthetic complications            Review of Systems / Medical History  Patient summary reviewed and pertinent labs reviewed    Pulmonary  Within defined limits                 Neuro/Psych   Within defined limits           Cardiovascular                  Exercise tolerance: >4 METS     GI/Hepatic/Renal  Within defined limits              Endo/Other        Arthritis     Other Findings   Comments: Chronic pain on methadone         Physical Exam    Airway  Mallampati: I  TM Distance: 4 - 6 cm  Neck ROM: normal range of motion   Mouth opening: Normal     Cardiovascular    Rhythm: regular  Rate: normal         Dental         Pulmonary  Breath sounds clear to auscultation               Abdominal         Other Findings            Anesthetic Plan    ASA: 2  Anesthesia type: general      Post-op pain plan if not by surgeon: peripheral nerve block single    Induction: Intravenous  Anesthetic plan and risks discussed with: Patient      Multiple lumbar fusions and patient still has lumbar radicular pain b/l.   After discussion with patient, we will plan for PNB + GETA

## 2019-12-09 NOTE — PERIOP NOTES
Teach back method used with patient concerning hibiclens wash, TB screening, incentive spirometer, pain management goals, and discharge needs list.Is goal [id number]

## 2019-12-09 NOTE — PERIOP NOTES
TRANSFER - OUT REPORT:    Verbal report given to Kirit Donovan receiving nurse(name) on Tangela Sulma being transferred to [facility](unit) for routine progression of care       Report consisted of patient's Situation, Background, Assessment and   Recommendations(SBAR). Information from the following report(s) OR Summary, Procedure Summary, Intake/Output and MAR was reviewed with the receiving nurse. Opportunity for questions and clarification was provided.       Patient transported with:   [employer]

## 2019-12-09 NOTE — ANESTHESIA POSTPROCEDURE EVALUATION
Procedure(s):  GEMINI/ RIGHT KNEE ARTHROPLASTY TOTAL /.    general    Anesthesia Post Evaluation      Multimodal analgesia: multimodal analgesia used between 6 hours prior to anesthesia start to PACU discharge  Patient location during evaluation: PACU  Patient participation: complete - patient participated  Level of consciousness: awake and alert  Pain management: adequate  Airway patency: patent  Anesthetic complications: no  Cardiovascular status: acceptable and hemodynamically stable  Respiratory status: acceptable  Hydration status: acceptable        Vitals Value Taken Time   /81 12/9/2019 11:03 AM   Temp 36.7 °C (98 °F) 12/9/2019  9:59 AM   Pulse 47 12/9/2019 11:03 AM   Resp 12 12/9/2019 11:03 AM   SpO2 95 % 12/9/2019 11:03 AM

## 2019-12-09 NOTE — PROGRESS NOTES
The Plan for Transition of Care is related to the following treatment goals to increase mobility. The Patient was provided with a choice of provider and agrees   with the discharge plan. [x] Yes [] No    Freedom of choice list was provided with basic dialogue that supports the patient's individualized plan of care/goals, treatment preferences and shares the quality data associated with the providers.  [x] Yes [] No

## 2019-12-09 NOTE — PERIOP NOTES
TRANSFER - OUT REPORT:    Verbal report given to noah rn on Georgette Almaguer  being transferred to [facility] for routine progression of care       Report consisted of patients Situation, Background, Assessment and   Recommendations(SBAR). Information from the following report(s) SBAR, Kardex, [facility] and Recent Results was reviewed with the receiving nurse. Lines:   Peripheral IV 12/09/19 Right Hand (Active)   Site Assessment Clean, dry, & intact 12/9/2019  6:21 AM   Phlebitis Assessment 0 12/9/2019  6:21 AM   Infiltration Assessment 0 12/9/2019  6:21 AM   Dressing Status Clean, dry, & intact 12/9/2019  6:21 AM   Dressing Type Tape;Transparent 12/9/2019  6:21 AM   Hub Color/Line Status Infusing 12/9/2019  6:21 AM        Opportunity for questions and clarification was provided.       Patient transported with:   [employer]

## 2019-12-09 NOTE — PROGRESS NOTES
[facility] - INPATIENT  Face to Face Encounter    Patients Name: Mohan Hernandez    YOB: 1965    Ordering Physician:   Sita Beyer    Primary Diagnosis: Primary osteoarthritis of right knee [M17.11]  Osteoarthritis [M19.90]    Date of Face to Face:   12/9/2019                                  Face to Face Encounter findings are related to primary reason for home care:   yes. 1. I certify that the patient needs intermittent care as follows: physical therapy: strengthening and gait/stair training    2. I certify that this patient is homebound, that is: 1) patient requires the use of a walker device, special transportation, or assistance of another to leave the home; or 2) patient's condition makes leaving the home medically contraindicated; and 3) patient has a normal inability to leave the home and leaving the home requires considerable and taxing effort. Patient may leave the home for infrequent and short duration for medical reasons, and occasional absences for non-medical reasons. Homebound status is due to the following functional limitations: Patient currently under activity restrictions secondary to recent surgical procedure, this hinders their ability to safely leave the home. 3. I certify that this patient is under my care and that I, or a nurse practitioner or  [id number], or clinical nurse specialist, or certified nurse midwife, working with me, had a Face-to-Face Encounter that meets the physician Face-to-Face Encounter requirements.   The following are the clinical findings from the [address] [place] [address] encounter that support the need for skilled services and is a summary of the encounter: See Progress Notes     See attached progess note      Ysitie 69, LBSW  12/9/2019      THE FOLLOWING TO BE COMPLETED BY THE COMMUNITY PHYSICIAN:    I concur with the findings described above from the [facility] encounter that this patient is homebound and in need of a skilled service.     Certifying Physician: _____________________________________      Printed Certifying Physician Name: _____________________________________    Date: _________________

## 2019-12-09 NOTE — PROGRESS NOTES
Care Management Interventions  Transition of Care Consult (CM Consult): Discharge Planning, [address]: Yes  Physical Therapy Consult: Yes  Occupational Therapy Consult: Yes  Current Support Network: Lives with Spouse, Own Home  Confirm Follow Up Transport: Family  Plan discussed with Pt/Family/Caregiver: Yes  Freedom of Choice Offered: Yes  Discharge Location  Discharge Placement: Home with home health        SW spoke with pt & his girlfriend at bedside. Pt confirms d/c plan is home to girlfriend's home with family support and HH. Pt states has all needed DME. Pt was provided choices of [facility] and prefers [facility] who are aware of referral & the address pt will be at.

## 2019-12-09 NOTE — PROGRESS NOTES
TRANSFER - IN REPORT:    Verbal report received from Yolanda Daley (name) on Vicente Taylor  being received from PACU (unit) for routine progression of care      Report consisted of patients Situation, Background, Assessment and   Recommendations(SBAR). Information from the following report(s) SBAR, Kardex, Intake/Output, MAR and Recent Results was reviewed with the receiving nurse. Opportunity for questions and clarification was provided. Assessment will be completed upon patients arrival to unit and care assumed.

## 2019-12-09 NOTE — OP NOTES
[address] [place] [address] Robotic Assisted Total Knee Arthroplasty: Posterior Cruciate Retaining       Patient:Meir Parsons   : 1965  Medical Record [id number]  Pre-operative Diagnosis:  Primary osteoarthritis of right knee [M17.11]  Post-operative Diagnosis: Primary osteoarthritis of right knee [M17.11]  Location: [address] [place] [address]  Surgeon: Juan Carlos López MD   Assistant: Lori Amador    Anesthesia: General and FNB    Indications: Patient has end stage arthritis. They have tried and failed conservative management. Procedure:Procedure(s) (LRB):  GEMINI/ RIGHT KNEE ARTHROPLASTY TOTAL / (Right)   CPT Code: 16538  The complexity of the total joint surgery requires the use of a first assistant for positioning, retraction and expertise in closure. Tourniquet Time: 0 minutes  EBL: 250 cc  Findings: severe degenerative arthritis, patellar osteophytes, posterior femoral osteophytes   BMI: Body mass index is 26.29 kg/m². Tamara Petit was brought to the operating room and positioned on the operating table. He was anesthestized with anesthesia. A mills catheter was placed preoperatively and IV antibiotics was administered. Prior to the incision being made a timeout was called identifying the patient, procedure ,operative side and surgeon The operative leg was prepped and draped in the usual sterile manner. An anterior longitudinal incision was accomplished just medial to the tibial tubercle and extending approximal 6 centimeters proximal to the superior pole of the patella. A medial parapatellar capsular incision was performed. The medial capsular flap was elevated around to the insertion of the semimembranous tendon. The patella was everted and the knee flexed and externally rotated. The medial and external menisci were excised. The lateral half of the fat pad excised and the patella femoral ligament was released.   The anterior cruciate ligament was resect and the posterior cruciate ligament was retained. The femoral and tibial arrays were pinned in place and registered with the [employer]. The patient landmarks were collected and the tibial and femoral checkpoints were registered and verified. The preresection balancing was performed. The distal femur was addressed first.   Utilizing the [facility] robotic arm the distal femoral cut was made. The anterior and posterior cuts were then made. The osteophytes were removed from the tibial and femoral surfaces. The tibia was then addressed. The [employer] robotic arm was then used to make the measured resection of the tibia. The tibia was sized. The tibial base plate was pinned into place with the appropriate external rotation and stem site prepared. A trial femoral component and poly was placed. A preliminary range of motion was accomplished with the trial components. Tthe patient was found to obtain full extension as well as appropriate flexion. The patient's ligaments were stable in flexion and extension to medial and lateral stressing and the alignment was through the appropriate mechanical axis. Additional surgical procedures included: none. The patella was then everted. The bone was resect to accommodate the three peg patellar button. A trial reduction revealed appropriate tracking through the patellofemoral groove with no lateral retinacular release being accomplished. All trial components were removed. The real implants were opened: Sizes listed below. The knee was irrigated. There were no femoral deficiencies. There were no tibial deficiencies. No augmentation was utilized. The real components were impacted into place. The real patella component was then pressed in place. Дмитрий Perches knee was placed through range of motion and noted to be stable as mentioned above with the trail components. The wound was dry, therefore no drain was used.  The operative knee was injected with 60 cc of Naropin, 10 cc's of morphine and 1 cc of 30 mg of Toradol. The knee was then soaked with a diluted betadine solution for approximately 3 min. This was then thoroughly irrigated. The capsular layer was closed using a #1 PDS suture. Then, 1 gram (100 mg/ml) of Transexamic Acid was injected into the joint space. The subcutaneous layers were closed using 2-0 Stratafix. Finally the skin was closed using 3-0 Vicryl and staples which were applied in occlusive fashion and sterile bandage applied. An Iceman cryo pad was applied on the operative leg. Sponge count and needle counts were correct. Nirmal Troy left the operating room     Implants:   Implant Name Type Inv.  Item Serial No.  Lot No. LRB No. Used   COMPNT FEM CR TRIATHLN 4 R PA --  - [id number]  COMPNT FEM CR TRIATHLN 4 R PA --  [id number] [employer] HOW [id number] Right 1   BASEPLT TIB PC TRITNM SZ 4 -- TRIATHLON - [id number]  BASEPLT TIB PC TRITNM SZ 4 -- TRIATHLON [id number] [employer] HOW [id number] Right 1   PAT ASYM MTL-BK 11MM SZ A38 -- TRIATHLON - [id number]  PAT ASYM MTL-BK 11MM SZ A38 -- TRIATHLON [id number] [employer] HOW [id number] Right 1   TIBIAL BEARING INSERT   [id number]  [id number] Right 1         Signed By: Bebo Lockett MD   12/9/2019,  10:32 AM

## 2019-12-10 VITALS
SYSTOLIC BLOOD PRESSURE: 108 MMHG | HEIGHT: 69 IN | DIASTOLIC BLOOD PRESSURE: 55 MMHG | RESPIRATION RATE: 18 BRPM | TEMPERATURE: 98 F | BODY MASS INDEX: 26.36 KG/M2 | OXYGEN SATURATION: 97 % | HEART RATE: 50 BPM | WEIGHT: 178 LBS

## 2019-12-10 LAB
ANION GAP SERPL CALC-SCNC: 5 MMOL/L (ref 7–16)
BUN SERPL-MCNC: 22 MG/DL (ref 6–23)
CALCIUM SERPL-MCNC: 8.8 MG/DL (ref 8.3–10.4)
CHLORIDE SERPL-SCNC: 103 MMOL/L (ref 98–107)
CO2 SERPL-SCNC: 30 MMOL/L (ref 21–32)
CREAT SERPL-MCNC: 0.96 MG/DL (ref 0.8–1.5)
GLUCOSE SERPL-MCNC: 181 MG/DL (ref 65–100)
HGB BLD-MCNC: 12.2 G/DL (ref 13.6–17.2)
POTASSIUM SERPL-SCNC: 4.1 MMOL/L (ref 3.5–5.1)
SODIUM SERPL-SCNC: 138 MMOL/L (ref 136–145)

## 2019-12-10 PROCEDURE — 97110 THERAPEUTIC EXERCISES: CPT

## 2019-12-10 PROCEDURE — 80048 BASIC METABOLIC PNL TOTAL CA: CPT

## 2019-12-10 PROCEDURE — 97150 GROUP THERAPEUTIC PROCEDURES: CPT

## 2019-12-10 PROCEDURE — 97535 SELF CARE MNGMENT TRAINING: CPT

## 2019-12-10 PROCEDURE — 85018 HEMOGLOBIN: CPT

## 2019-12-10 PROCEDURE — 97116 GAIT TRAINING THERAPY: CPT

## 2019-12-10 PROCEDURE — 36415 COLL VENOUS BLD VENIPUNCTURE: CPT

## 2019-12-10 PROCEDURE — 74011250636 HC RX REV CODE- 250/636: Performed by: ORTHOPAEDIC SURGERY

## 2019-12-10 PROCEDURE — 74011250637 HC RX REV CODE- 250/637: Performed by: ORTHOPAEDIC SURGERY

## 2019-12-10 PROCEDURE — 74011250637 HC RX REV CODE- 250/637: Performed by: PHYSICIAN ASSISTANT

## 2019-12-10 RX ADMIN — OXYCODONE HYDROCHLORIDE 10 MG: 5 TABLET ORAL at 09:48

## 2019-12-10 RX ADMIN — OXYCODONE HYDROCHLORIDE 10 MG: 5 TABLET ORAL at 03:31

## 2019-12-10 RX ADMIN — Medication 10 ML: at 06:09

## 2019-12-10 RX ADMIN — ASPIRIN 81 MG: 81 TABLET ORAL at 07:43

## 2019-12-10 RX ADMIN — Medication 1 AMPULE: at 07:43

## 2019-12-10 RX ADMIN — METHADONE HYDROCHLORIDE 10 MG: 10 TABLET ORAL at 14:03

## 2019-12-10 RX ADMIN — SENNOSIDES AND DOCUSATE SODIUM 2 TABLET: 8.6; 5 TABLET ORAL at 07:43

## 2019-12-10 RX ADMIN — KETOROLAC TROMETHAMINE 15 MG: 15 INJECTION, SOLUTION INTRAMUSCULAR; INTRAVENOUS at 06:09

## 2019-12-10 RX ADMIN — OXYCODONE HYDROCHLORIDE 10 MG: 5 TABLET ORAL at 14:03

## 2019-12-10 RX ADMIN — METHADONE HYDROCHLORIDE 10 MG: 10 TABLET ORAL at 07:42

## 2019-12-10 NOTE — DISCHARGE INSTRUCTIONS
[address] [facility]   Patient Discharge Instructions    Monica Fried / [id number] : 1965    Admitted 2019 Discharged: 2019     IF YOU HAVE ANY PROBLEMS ONCE YOU ARE AT HOME CALL THE FOLLOWING NUMBERS:   Main office number: [phone]      Medications    · The medications you are to continue on are listed on the medication reconciliation sheet. · Narcotic pain medications as well as supplemental iron can cause constipation. If this occurs try stopping the narcotic pain medication and/or the iron. · It is important that you take the medication exactly as they are prescribed. · Medications which increase your risk of blood clots are listed to stop for 5 weeks after surgery as well as medications or supplements which increase your risk of bleeding complications. · Keep your medication in the bottles provided by the pharmacist and keep a list of the medication names, dosages, and times to be taken in your wallet. · Do not take other medications without consulting your doctor. Important Information    Do NOT smoke as this will greatly increase your risk of infection! Resume your prehospital diet. If you have excessive nausea or vomitting call your doctor's office     Leg swelling and warmth is normal for 6 months after surgery. If you experience swelling in your leg elevate you leg while laying down with your toes above your heart. If you have sudden onset severe swelling with leg pain call our office. The stitches deep inside take approximately 6 months to dissolve. There will be sharp shooting, stinging and burning pain. This is normal and will resolve between 3-6 months after surgery. Difficulty sleeping is normal following total Knee and Hip replacement. You may try melatonin, an over-the-counter sleep aid or benadryl to help with sleep. Most patients will resume sleeping through the night 8 weeks after surgery. Home Physical Therapy is arranged.  [facility] will contact you within 48 hrs of discharge that you have chosen. If you have not received a call within this time frame please contact that provider you chose. You should be given this information before you leave the hospital.     You are at a risk for falls. Use the rolling walker when walking. Patients who have had a joint replacement should not drive if they are still taking narcotic pain mediation during the daytime hours. Most patients wean themselves off of pain medication within 2-5 weeks after surgery. When to Call the office    - If you have a temperature greater then 101  - Uncontrolled vomiting   - Loose control of your bladder or bowel function  - Are unable to bear any weight   - Need a pain medication refill       DISCHARGE SUMMARY from Nurse    The following personal items collected during your admission are returned to you:   Dental Appliance: Dental Appliances: None  Vision:    Hearing Aid:    Jewelry:    Clothing: Clothing: Shirt  Other Valuables: Other Valuables: Cell Phone, [employer] sent to safe:      PATIENT INSTRUCTIONS:    After general anesthesia or intravenous sedation, for 24 hours or while taking prescription Narcotics:  · Limit your activities  · Do not drive and operate hazardous machinery  · Do not make important personal or business decisions  · Do  not drink alcoholic beverages  · If you have not urinated within 8 hours after discharge, please contact your surgeon on call. Report the following to your surgeon:  · Excessive pain, swelling, redness or odor of or around the surgical area  · Temperature over 101  · Nausea and vomiting lasting longer than 4 hours or if unable to take medications  · Any signs of decreased circulation or nerve impairment to extremity: change in color, persistent  numbness, tingling, coldness or increase pain  · Any questions, call office @ [phone]      Keep scheduled follow up appointment.   If need to change, call office @ [phone]. *  Please give a list of your current medications to your Primary Care Provider. *  Please update this list whenever your medications are discontinued, doses are      changed, or new medications (including over-the-counter products) are added. *  Please carry medication information at all times in case of emergency situations. Patient Education        Total Knee Replacement: What to Expect at Home  Your Recovery    When you leave the hospital, you should be able to move around with a walker or crutches. But you will need someone to help you at home for the next few weeks or until you have more energy and can move around better. If you need more extensive rehab, you may go to a specialized rehab center for more treatment. You will go home with a bandage and stitches, staples, tissue glue, or tape strips. Change the bandage as your doctor tells you to. If you have stitches or staples, your doctor will remove them 10 to 21 days after your surgery. Glue or tape strips will fall off on their own over time. You may still have some mild pain, and the area may be swollen for 3 to 6 months after surgery. Your knee will continue to improve for 6 to 12 months. You will probably use a walker for 1 to 3 weeks and then use crutches. When you are ready, you can use a cane. You will probably be able to walk on your own in 4 to 8 weeks. You will need to do months of physical rehabilitation (rehab) after a knee replacement. Rehab will help you strengthen the muscles of the knee and help you regain movement. After you recover, your artificial knee will allow you to do normal daily activities with less pain or no pain at all. You may be able to hike, dance, ride a bike, and play golf. Talk to your doctor about whether you can do more strenuous activities. Always tell your caregivers that you have an artificial knee.   How long it will take to walk on your own, return to normal activities, and go back to work depends on your health and how well your rehabilitation (rehab) program goes. The better you do with your rehab exercises, the quicker you will get your strength and movement back. This care sheet gives you a general idea about how long it will take for you to recover. But each person recovers at a different pace. Follow the steps below to get better as quickly as possible. How can you care for yourself at home? Activity    · Rest when you feel tired. You may take a nap, but do not stay in bed all day. When you sit, use a chair with arms. You can use the arms to help you stand up.     · Work with your physical therapist to find the best way to exercise. What you can do as your knee heals will depend on whether your new knee is cemented or uncemented. You may not be able to do certain things for a while if your new knee is uncemented.     · After your knee has healed enough, you can do more strenuous activities with caution. ? You can golf, but use a golf cart, and do not wear shoes with spikes. ? You can bike on a flat road or on a stationary bike. Avoid biking up hills. ? Your doctor may suggest that you stay away from activities that put stress on your knee. These include tennis or badminton, squash or racquetball, contact sports like football, jumping (such as in basketball), jogging, or running. ? Avoid activities where you might fall. These include horseback riding, skiing, and mountain biking.     · Do not sit for more than 1 hour at a time. Get up and walk around for a while before you sit again. If you must sit for a long time, prop up your leg with a chair or footstool. This will help you avoid swelling.     · Ask your doctor when you can drive again. It may take up to 8 weeks after knee replacement surgery before it is safe for you to drive.     · When you get into a car, sit on the edge of the seat.  Then pull in your legs, and turn to face the front.     · You should be able to do many everyday activities 3 to 6 weeks after your surgery. You will probably need to take 4 to 16 weeks off from work. When you can go back to work depends on the type of work you do and how you feel.     · Ask your doctor when it is okay for you to have sex.     · Do not lift anything heavier than 10 pounds and do not lift weights for 12 weeks. Diet    · By the time you leave the hospital, you should be eating your normal diet. If your stomach is upset, try bland, low-fat foods like plain rice, broiled chicken, toast, and yogurt. Your doctor may suggest that you take iron and vitamin supplements.     · Drink plenty of fluids (unless your doctor tells you not to).   · Eat healthy foods, and watch your portion sizes. Try to stay at your ideal weight. Too much weight puts more stress on your new knee.     · You may notice that your bowel movements are not regular right after your surgery. This is common. Try to avoid constipation and straining with bowel movements. You may want to take a fiber supplement every day. If you have not had a bowel movement after a couple of days, ask your doctor about taking a mild laxative. Medicines    · Your doctor will tell you if and when you can restart your medicines. He or she will also give you instructions about taking any new medicines.     · If you take blood thinners, such as warfarin (Coumadin), clopidogrel (Plavix), or aspirin, be sure to talk to your doctor. He or she will tell you if and when to start taking those medicines again. Make sure that you understand exactly what your doctor wants you to do.     · Your doctor may give you a blood-thinning medicine to prevent blood clots. If you take a blood thinner, be sure you get instructions about how to take your medicine safely. Blood thinners can cause serious bleeding problems. This medicine could be in pill form or as a shot (injection).  If a shot is necessary, your doctor will tell you how to do this.     · Be safe with medicines. Take pain medicines exactly as directed. ? If the doctor gave you a prescription medicine for pain, take it as prescribed. ? If you are not taking a prescription pain medicine, ask your doctor if you can take an over-the-counter medicine. ? Plan to take your pain medicine 30 minutes before exercises. It is easier to prevent pain before it starts than to stop it once it has started.     · If you think your pain medicine is making you sick to your stomach:  ? Take your medicine after meals (unless your doctor has told you not to). ? Ask your doctor for a different pain medicine.     · If your doctor prescribed antibiotics, take them as directed. Do not stop taking them just because you feel better. You need to take the full course of antibiotics. Incision care    · If your doctor told you how to care for your cut (incision), follow your doctor's instructions. You will have a dressing over the cut. A dressing helps the incision heal and protects it. Your doctor will tell you how to take care of this.     · If you did not get instructions, follow this general advice:  ? If you have strips of tape on the cut the doctor made, leave the tape on for a week or until it falls off.  ? If you have stitches or staples, your doctor will tell you when to come back to have them removed. ? If you have skin adhesive on the cut, leave it on until it falls off. Skin adhesive is also called glue or liquid stitches. ? Change the bandage every day. ? Wash the area daily with warm water, and pat it dry. Don't use hydrogen peroxide or alcohol. They can slow healing. ? You may cover the area with a gauze bandage if it oozes fluid or rubs against clothing. ? You may shower 24 to 48 hours after surgery. Pat the incision dry. Don't swim or take a bath for the first 2 weeks, or until your doctor tells you it is okay.    Exercise    · Your rehab program will give you a number of exercises to do to help you get back your knee's range of motion and strength. Always do them as your therapist tells you. Ice and elevation    · For pain and swelling, put ice or a cold pack on the area for 10 to 20 minutes at a time. Put a thin cloth between the ice and your skin. Other instructions    · Continue to wear your support stockings as your doctor says. These help to prevent blood clots. The length of time that you will have to wear them depends on your activity level and the amount of swelling.     · You have metal pieces in your knee. These may set off some airport metal detectors. Carry a medical alert card that says you have an artificial joint, just in case. Follow-up care is a key part of your treatment and safety. Be sure to make and go to all appointments, and call your doctor if you are having problems. It's also a good idea to know your test results and keep a list of the medicines you take. When should you call for help? Call 911 anytime you think you may need emergency care. For example, call if:    · You passed out (lost consciousness).     · You have severe trouble breathing.     · You have sudden chest pain and shortness of breath, or you cough up blood.    Call your doctor now or seek immediate medical care if:    · You have signs of infection, such as:  ? Increased pain, swelling, warmth, or redness. ? Red streaks leading from the incision. ? Pus draining from the incision. ? A fever.     · You have signs of a blood clot, such as:  ? Pain in your calf, back of the knee, thigh, or groin. ? Redness and swelling in your leg or groin.     · Your incision comes open and begins to bleed, or the bleeding increases.     · You have pain that does not get better after you take pain medicine.    Watch closely for changes in your health, and be sure to contact your doctor if:    · You do not have a bowel movement after taking a laxative. Where can you learn more? Go to http://jazmine-gonzalo.info/.   Enter T054 in the search box to learn more about \"Total Knee Replacement: What to Expect at Home. \"  Current as of: June 26, 2019  Content Version: 12.2  © 6193-9171 [employer]. Care instructions adapted under license by [employer] (which disclaims liability or warranty for this information). If you have questions about a medical condition or this instruction, always ask your healthcare professional. [address] any warranty or liability for your use of this information. These are general instructions for a healthy lifestyle:    No smoking/ No tobacco products/ Avoid exposure to second hand smoke    Surgeon General's Warning:  Quitting smoking now greatly reduces serious risk to your health. Obesity, smoking, and sedentary lifestyle greatly increases your risk for illness    A healthy diet, regular physical exercise & weight monitoring are important for maintaining a healthy lifestyle    You may be retaining fluid if you have a history of heart failure or if you experience any of the following symptoms:  Weight gain of 3 pounds or more overnight or 5 pounds in a week, increased swelling in our hands or feet or shortness of breath while lying flat in bed. Please call your doctor as soon as you notice any of these symptoms; do not wait until your next office visit. Recognize signs and symptoms of STROKE:    F-face looks uneven    A-arms unable to move or move even    S-speech slurred or non-existent    T-time-call 911 as soon as signs and symptoms begin-DO NOT go       Back to bed or wait to see if you get better-TIME IS BRAIN. The discharge information has been reviewed with the patient. The patient verbalized understanding. Information obtained by :  I understand that if any problems occur once I am at home I am to contact my physician. I understand and acknowledge receipt of the instructions indicated above. Physician's or R.N.'s Signature                                                                  Date/Time                                                                                                                                              Patient or Representative Signature                                                          Date/Time

## 2019-12-10 NOTE — PROGRESS NOTES
Problem: Mobility Impaired (Adult and Pediatric)  Goal: *Acute Goals and Plan of Care (Insert Text)  Description  GOALS (1-4 days):  (1.)Mr. Abdias Todd will move from supine to sit and sit to supine  in bed with INDEPENDENT. (2.)Mr. Abdias Todd will transfer from bed to chair and chair to bed with SUPERVISION using the least restrictive device. (3.)Mr. Abdias Todd will ambulate with SUPERVISION for 200 feet with the least restrictive device. (4.)Mr. Abdias Todd will ambulate up/down 3 steps with right railing with STAND BY ASSIST with no device. (5.)Mr. Abdias Todd will increase right knee ROM to 5°-80°.  ________________________________________________________________________________________________   Outcome: Progressing Towards Goal     PHYSICAL THERAPY JOINT CAMP TKA: Daily Note, Treatment Day: 1st and AM 12/10/2019  INPATIENT: Hospital Day: 2  Payor: Gibran Rodrigues / Plan: [facility] [employer] MEDICARE CHOICE PPO/PFFS / Product Type: Managed Care Medicare /      NAME/AGE/GENDER: Дмитрий Cobos is a 62 y.o. male   PRIMARY DIAGNOSIS:  Primary osteoarthritis of right knee [M17.11]   Procedure(s) and Anesthesia Type:     * GEMINI/ RIGHT KNEE ARTHROPLASTY TOTAL / - General (Right)  ICD-10: Treatment Diagnosis:    · Pain in Right Hip (M25.551)  · Stiffness of Right Hip, Not elsewhere classified (M25.651)  · Difficulty in walking, Not elsewhere classified (R26.2)      ASSESSMENT:     Mr. Abdias Todd presents with impaired strength & mobility s/p right TKA. This pt had decreased stability during out of bed activity. This pt will benefit from follow up therapy to help restore safe function prior to returning home with caregiver. 12/10--Pt performed sit to stand. Pt ambulated in hallway. Pt performed exercises in bedside chair. Pt in chair with needs in reach. This section established at most recent assessment   PROBLEM LIST (Impairments causing functional limitations):  1. Decreased Strength  2. Decreased ADL/Functional Activities  3.  Decreased Transfer Abilities  4. Decreased Ambulation Ability/Technique  5. Decreased Balance  6. Increased Pain  7. Decreased Activity Tolerance  8. Decreased Flexibility/Joint Mobility  9. Decreased [place] with Home Exercise Program   INTERVENTIONS PLANNED: (Benefits and precautions of physical therapy have been discussed with the patient.)  1. bed mobility  2. gait training  3. home exercise program (HEP)  4. Range of Motion: active/assisted/passive  5. Therapeutic Activities  6. therapeutic exercise/strengthening  7. transfer training  8. Group Therapy     TREATMENT PLAN: Frequency/Duration: Follow patient BID for duration of hospital stay to address above goals. Rehabilitation Potential For Stated Goals: Good     RECOMMENDED REHABILITATION/EQUIPMENT: (at time of discharge pending progress): Continue Skilled Therapy and Home Health: Physical Therapy. HISTORY:   History of Present Injury/Illness (Reason for Referral):  Right TKA  Past Medical History/Comorbidities:   Mr. Victor M Callahan  has a past medical history of Arthritis, Bradycardia, Chronic pain, Kidney stones, and Status post total knee replacement, right (12/9/2019). Mr. Victor M Callahan  has a past surgical history that includes hx knee replacement (Left, 2016); hx knee arthroscopy (2014); hx other surgical (2000); and hx other surgical (2001).   Social History/Living Environment:   Home Environment: Private residence  # Steps to Enter: 3  Rails to Enter: Yes  Hand Rails : Right  One/Two Story Residence: One story  Living Alone: No  Support Systems: Spouse/Significant Other/Partner  Patient Expects to be Discharged to[de-identified] Private residence  Current DME Used/Available at Home: Commode, bedside, Shower chair, Walker, rolling  Tub or Shower Type: Shower  Prior Level of Function/Work/Activity:  Pt was independent without an assistive device prior tot his admission   Number of Personal Factors/Comorbidities that affect the Plan of Care: 3+: HIGH COMPLEXITY EXAMINATION:   Most Recent Physical Functioning:                            Bed Mobility  Supine to Sit: Supervision  Scooting: Independent    Transfers  Sit to Stand: Stand-by assistance  Stand to Sit: Stand-by assistance  Bed to Chair: Supervision    Balance  Sitting: Intact  Standing: Intact              Weight Bearing Status  Right Side Weight Bearing: As tolerated  Distance (ft): 125 Feet (ft)  Ambulation - Level of Assistance: Contact guard assistance  Assistive Device: Walker, rolling  Gait Abnormalities: Antalgic        Braces/Orthotics: none    Right Knee Cold  Type: Cryocuff      Body Structures Involved:  1. Joints  2. Muscles Body Functions Affected:  1. Sensory/Pain  2. Movement Related Activities and Participation Affected:  1. General Tasks and Demands  2. Mobility   Number of elements that affect the Plan of Care: 4+: HIGH COMPLEXITY   CLINICAL PRESENTATION:   Presentation: Stable and uncomplicated: LOW COMPLEXITY   CLINICAL DECISION MAKIN[address] [place] [id number] AM-PAC 6 Clicks   Basic Mobility Inpatient Short Form  How much difficulty does the patient currently have. .. Unable A Lot A Little None   1. Turning over in bed (including adjusting bedclothes, sheets and blankets)? [] 1   [] 2   [x] 3   [] 4   2. Sitting down on and standing up from a chair with arms ( e.g., wheelchair, bedside commode, etc.)   [] 1   [] 2   [x] 3   [] 4   3. Moving from lying on back to sitting on the side of the bed? [] 1   [] 2   [x] 3   [] 4   How much help from another person does the patient currently need. .. Total A Lot A Little None   4. Moving to and from a bed to a chair (including a wheelchair)? [] 1   [] 2   [x] 3   [] 4   5. Need to walk in hospital room? [] 1   [] 2   [x] 3   [] 4   6. Climbing 3-5 steps with a railing? [x] 1   [] 2   [] 3   [] 4   © , Trustees of [address] [place] [id number], under license to [employer].  All rights reserved     Score:  Initial: 16 Most Recent: X (Date: -- ) Interpretation of Tool:  Represents activities that are increasingly more difficult (i.e. Bed mobility, Transfers, Gait). Medical Necessity:     · Patient is expected to demonstrate progress in   · strength, range of motion, balance, coordination, and functional technique  ·  to   · decrease assistance required with bed mobility, transfers & gait   · .  Reason for Services/Other Comments:  · Patient continues to require skilled intervention due to   · Pt not independent with functional mobility   · . Use of outcome tool(s) and clinical judgement create a POC that gives a: Clear prediction of patient's progress: LOW COMPLEXITY            TREATMENT:   (In addition to Assessment/Re-Assessment sessions the following treatments were rendered)     Pre-treatment Symptoms/Complaints:  none  Pain Initial: numeric scale  Pain Intensity 1: 8  Pain Location 1: Knee  Pain Orientation 1: Right  Post Session:  3/10     Therapeutic Exercise: ( 8 minutes):  Exercises per grid below to improve mobility and dynamic movement of leg - right to improve functional endurance . Required minimal verbal cues to promote proper body alignment and promote proper body mechanics. Progressed range and repetitions as indicated. Gait Training (  15 minutes):  Gait training to improve and/or restore physical functioning as related to mobility.    Assistive Device: Walker, rolling  Ambulation - Level of Assistance: Contact guard assistance  Distance (ft): 125 Feet (ft)       Date:  12/9 Date:  12/10 Date:     ACTIVITY/EXERCISE AM PM AM PM AM PM   GROUP THERAPY  []  []  []  []  []  []   Ankle Pumps  10 10      Quad Sets  10 10      Gluteal Sets  10 10      Hip ABd/ADduction  10 10      Straight Leg Raises  10 10      Knee Slides  10 10      Short Arc Quads  10 10      Long Arc Quads         Chair Slides                  B = bilateral; AA = active assistive; A = active; P = passive      Treatment/Session Assessment:     Response to Treatment: Pt agreeable to exercise and ambulate with therapy. Education:  [x] Home Exercises  [x] Fall Precautions  []  [] D/C Instruction Review  [] Knee Prosthesis Review  [x] Walker Management/Safety [] Adaptive Equipment as Needed       Interdisciplinary Collaboration:   o Physical Therapist  o Registered Nurse    After treatment position/precautions:   o Up in chair  o Bed/Chair-wheels locked  o Caregiver at bedside  o Call light within reach  o RN notified  o Family at bedside    Compliance with Program/Exercises: Will assess as treatment progresses. Recommendations/Intent for next treatment session:  Treatment next visit will focus on increasing Mr. Bonillas independence with bed mobility, transfers, gait training, strength/ROM exercises, modalities for pain, and patient education.       Total Treatment Duration:  PT Patient Time In/Time Out  Time In: 0937  Time Out: 63 Anine Cunningham PT

## 2019-12-10 NOTE — PROGRESS NOTES
Continuous sat no # placed on patient. Alarm limits set per protocol and monitor history was cleared.

## 2019-12-10 NOTE — PROGRESS NOTES
December 10, 2019         Post Op day: 1 Day Post-OpProcedure(s) (LRB):  GEMINI/ RIGHT KNEE ARTHROPLASTY TOTAL / (Right)      Admit Date: 2019  Admit Diagnosis: Primary osteoarthritis of right knee [M17.11]  Osteoarthritis [M19.90]    LAB:    Recent Results (from the past 24 hour(s))   EKG, 12 LEAD, INITIAL    Collection Time: 19  1:47 PM   Result Value Ref Range    Ventricular Rate 52 BPM    Atrial Rate 52 BPM    P-R Interval 142 ms    QRS Duration 100 ms    Q-T Interval 478 ms    QTC Calculation (Bezet) 444 ms    Calculated P Axis 36 degrees    Calculated R Axis 44 degrees    Calculated T Axis 30 degrees    Diagnosis       Sinus bradycardia  Nonspecific T wave abnormality  Abnormal ECG  No previous ECGs available  Confirmed by BAM GONZALEZ (), Crissy Iqbal ([id number]) on 2019 3:25:27 PM     HEMOGLOBIN    Collection Time: 19  7:14 PM   Result Value Ref Range    HGB 12.1 (L) 13.6 - 17.2 g/dL   HEMOGLOBIN    Collection Time: 12/10/19  3:58 AM   Result Value Ref Range    HGB 12.2 (L) 13.6 - 07.4 g/dL   METABOLIC PANEL, BASIC    Collection Time: 12/10/19  3:58 AM   Result Value Ref Range    Sodium 138 136 - 145 mmol/L    Potassium 4.1 3.5 - 5.1 mmol/L    Chloride 103 98 - 107 mmol/L    CO2 30 21 - 32 mmol/L    Anion gap 5 (L) 7 - 16 mmol/L    Glucose 181 (H) 65 - 100 mg/dL    BUN 22 6 - 23 MG/DL    Creatinine 0.96 0.8 - 1.5 MG/DL    GFR est AA >60 >60 ml/min/1.73m2    GFR est non-AA >60 >60 ml/min/1.73m2    Calcium 8.8 8.3 - 10.4 MG/DL     Vital Signs:    Patient Vitals for the past 8 hrs:   BP Temp Pulse Resp SpO2   12/10/19 0705 119/60 98 °F (36.7 °C) (!) 52 20 97 %   12/10/19 0355 136/66 98.1 °F (36.7 °C) (!) 54 18 96 %   12/10/19 0050 137/81 96.2 °F (35.7 °C) (!) 51 18 95 %     Temp (24hrs), Av.8 °F (36.6 °C), Min:96.2 °F (35.7 °C), Max:98.1 °F (36.7 °C)    Body mass index is 26.29 kg/m².   Pain Control:   Pain Assessment  Pain Scale 1: Numeric (0 - 10)  Pain Intensity 1: 5  Pain Onset 1: at rest  Pain Location 1: Knee  Pain Orientation 1: Right  Pain Description 1: Aching, Constant  Pain Intervention(s) 1: Emotional support    Subjective: Doing well, No complaints, No SOB, No Chest Pain, No nausea or vomiting     Objective: Vital Signs are Stable, No Acute Distress, Alert and Oriented, Dressing is dry,  Neurovascular exam is normal.       PT/OT:         Activity Response: Tolerated well  Assistive Device: Walker (comment)     RLE PROM  R Knee Flexion: 60(~post op)  R Knee Extension: -10(~post op)          Wieght Bearing Status: WBAT    Meds:  [unfilled]  [unfilled]  [unfilled]    Assessment:   Patient Active Problem List   Diagnosis Code    Osteoarthritis of right knee M17.11    Status post total knee replacement, right Z96.653             Plan: Continue Physical Therapy, Monitor labs, home today or tomorrow.         Signed By: Presley Valenzuela MD

## 2019-12-10 NOTE — PROGRESS NOTES
Updated Dr. Gianna Rojas when he called regarding patient update about reaction to Ancef IV and that he is going to refuse next dose. Orders ok to not give.

## 2019-12-10 NOTE — PROGRESS NOTES
Problem: Mobility Impaired (Adult and Pediatric)  Goal: *Acute Goals and Plan of Care (Insert Text)  Description  GOALS (1-4 days):  (1.)Mr. Zaid Jones will move from supine to sit and sit to supine  in bed with INDEPENDENT. (2.)Mr. Zaid Jones will transfer from bed to chair and chair to bed with SUPERVISION using the least restrictive device. (3.)Mr. Zaid Jones will ambulate with SUPERVISION for 200 feet with the least restrictive device. (4.)Mr. Zaid Jones will ambulate up/down 3 steps with right railing with STAND BY ASSIST with no device. (5.)Mr. Zaid Jones will increase right knee ROM to 5°-80°.  ________________________________________________________________________________________________   Outcome: Progressing Towards Goal     PHYSICAL THERAPY JOINT CAMP TKA: Daily Note, Treatment Day: 1st and PM 12/10/2019  INPATIENT: Hospital Day: 2  Payor: Laurel Kirkland / Plan: [facility] MEDICARE CHOICE PPO/PFFS / Product Type: [employer] Medicare /      NAME/AGE/GENDER: Christina Fuentes is a 62 y.o. male   PRIMARY DIAGNOSIS:  Primary osteoarthritis of right knee [M17.11]   Procedure(s) and Anesthesia Type:     * GEMINI/ RIGHT KNEE ARTHROPLASTY TOTAL / - General (Right)  ICD-10: Treatment Diagnosis:    · Pain in Right Hip (M25.551)  · Stiffness of Right Hip, Not elsewhere classified (M25.651)  · Difficulty in walking, Not elsewhere classified (R26.2)      ASSESSMENT:     Mr. Zaid Jones presents with impaired strength & mobility s/p right TKA. This pt had decreased stability during out of bed activity. This pt will benefit from follow up therapy to help restore safe function prior to returning home with caregiver. 12/10--Pt performed sit to stand. Pt ambulated in hallway. Pt performed exercises in bedside chair. Pt in chair with needs in reach. PM--Pt performed sit to stand. Pt ambulated in hallway. Pt performed exercises in rehab room.     This section established at most recent assessment   PROBLEM LIST (Impairments causing functional limitations):  1. Decreased Strength  2. Decreased ADL/Functional Activities  3. Decreased Transfer Abilities  4. Decreased Ambulation Ability/Technique  5. Decreased Balance  6. Increased Pain  7. Decreased Activity Tolerance  8. Decreased Flexibility/Joint Mobility  9. Decreased [place] with Home Exercise Program   INTERVENTIONS PLANNED: (Benefits and precautions of physical therapy have been discussed with the patient.)  1. bed mobility  2. gait training  3. home exercise program (HEP)  4. Range of Motion: active/assisted/passive  5. Therapeutic Activities  6. therapeutic exercise/strengthening  7. transfer training  8. Group Therapy     TREATMENT PLAN: Frequency/Duration: Follow patient BID for duration of hospital stay to address above goals. Rehabilitation Potential For Stated Goals: Good     RECOMMENDED REHABILITATION/EQUIPMENT: (at time of discharge pending progress): Continue Skilled Therapy and Home Health: Physical Therapy. HISTORY:   History of Present Injury/Illness (Reason for Referral):  Right TKA  Past Medical History/Comorbidities:   Mr. Zaid Jones  has a past medical history of Arthritis, Bradycardia, Chronic pain, Kidney stones, and Status post total knee replacement, right (12/9/2019). Mr. Zaid Jones  has a past surgical history that includes hx knee replacement (Left, 2016); hx knee arthroscopy (2014); hx other surgical (2000); and hx other surgical (2001).   Social History/Living Environment:   Home Environment: Private residence  # Steps to Enter: 3  Rails to Enter: Yes  Hand Rails : Right  One/Two Story Residence: One story  Living Alone: No  Support Systems: Spouse/Significant Other/Partner  Patient Expects to be Discharged to[de-identified] Private residence  Current DME Used/Available at Home: Commode, bedside, Shower chair, Walker, rolling  Tub or Shower Type: Shower  Prior Level of Function/Work/Activity:  Pt was independent without an assistive device prior tot his admission   Number of Personal Factors/Comorbidities that affect the Plan of Care: 3+: HIGH COMPLEXITY   EXAMINATION:   Most Recent Physical Functioning:               RLE AROM  R Knee Flexion: 76  R Knee Extension: 8            Bed Mobility  Supine to Sit: Supervision  Scooting: Independent    Transfers  Sit to Stand: Stand-by assistance  Stand to Sit: Stand-by assistance  Bed to Chair: Supervision    Balance  Sitting: Intact  Standing: Intact              Weight Bearing Status  Right Side Weight Bearing: As tolerated  Distance (ft): 208 Feet (ft)(walked distance twice)  Ambulation - Level of Assistance: Contact guard assistance  Assistive Device: Walker, rolling  Gait Abnormalities: Antalgic        Braces/Orthotics: none    Right Knee Cold  Type: Cryocuff      Body Structures Involved:  1. Joints  2. Muscles Body Functions Affected:  1. Sensory/Pain  2. Movement Related Activities and Participation Affected:  1. General Tasks and Demands  2. Mobility   Number of elements that affect the Plan of Care: 4+: HIGH COMPLEXITY   CLINICAL PRESENTATION:   Presentation: Stable and uncomplicated: LOW COMPLEXITY   CLINICAL DECISION MAKIN [facility] [address] AM-PAC 6 Clicks   Basic Mobility Inpatient Short Form  How much difficulty does the patient currently have. .. Unable A Lot A Little None   1. Turning over in bed (including adjusting bedclothes, sheets and blankets)? [] 1   [] 2   [x] 3   [] 4   2. Sitting down on and standing up from a chair with arms ( e.g., wheelchair, bedside commode, etc.)   [] 1   [] 2   [x] 3   [] 4   3. Moving from lying on back to sitting on the side of the bed? [] 1   [] 2   [x] 3   [] 4   How much help from another person does the patient currently need. .. Total A Lot A Little None   4. Moving to and from a bed to a chair (including a wheelchair)? [] 1   [] 2   [x] 3   [] 4   5. Need to walk in hospital room? [] 1   [] 2   [x] 3   [] 4   6. Climbing 3-5 steps with a railing?    [x] 1   [] 2   [] 3 [] 4   © 2007, Trustees of [facility] [employer], under license to [employer]. All rights reserved     Score:  Initial: 16 Most Recent: X (Date: -- )    Interpretation of Tool:  Represents activities that are increasingly more difficult (i.e. Bed mobility, Transfers, Gait). Medical Necessity:     · Patient is expected to demonstrate progress in   · strength, range of motion, balance, coordination, and functional technique  ·  to   · decrease assistance required with bed mobility, transfers & gait   · .  Reason for Services/Other Comments:  · Patient continues to require skilled intervention due to   · Pt not independent with functional mobility   · . Use of outcome tool(s) and clinical judgement create a POC that gives a: Clear prediction of patient's progress: LOW COMPLEXITY            TREATMENT:   (In addition to Assessment/Re-Assessment sessions the following treatments were rendered)     Pre-treatment Symptoms/Complaints:  none  Pain Initial: numeric scale  Pain Intensity 1: 8  Pain Location 1: Knee  Pain Orientation 1: Right  Post Session:  3/10     Therapeutic Exercise: ( 45 minutes--group):  Exercises per grid below to improve mobility and dynamic movement of leg - right to improve functional endurance . Required minimal verbal cues to promote proper body alignment and promote proper body mechanics. Progressed range and repetitions as indicated. Gait Training (  15 minutes):  Gait training to improve and/or restore physical functioning as related to mobility.    Assistive Device: Walker, rolling  Ambulation - Level of Assistance: Contact guard assistance  Distance (ft): 208 Feet (ft)(walked distance twice)       Date:  12/9 Date:  12/10 Date:     ACTIVITY/EXERCISE AM PM AM PM AM PM   GROUP THERAPY  []  []  []  [x]  []  []   Ankle Pumps  10 10 15     Quad Sets  10 10 15     Gluteal Sets  10 10 15     Hip ABd/ADduction  10 10 15     Straight Leg Raises  10 10 15     Knee Slides  10 10 15     Short Arc Quads  10 10 15     Long Arc Quads         Chair Slides    15              B = bilateral; AA = active assistive; A = active; P = passive      Treatment/Session Assessment:     Response to Treatment:  Pt agreeable to exercise and ambulate with therapy in rehab room. Education:  [x] Home Exercises  [x] Fall Precautions  []  [] D/C Instruction Review  [x] Knee Prosthesis Review  [x] Walker Management/Safety [] Adaptive Equipment as Needed       Interdisciplinary Collaboration:   o Physical Therapist  o Registered Nurse    After treatment position/precautions:   o Up in chair  o Bed/Chair-wheels locked  o Call light within reach  o RN notified    Compliance with Program/Exercises: Will assess as treatment progresses. Recommendations/Intent for next treatment session:  Treatment next visit will focus on increasing Mr. Bonillas independence with bed mobility, transfers, gait training, strength/ROM exercises, modalities for pain, and patient education.       Total Treatment Duration:  PT Patient Time In/Time Out  Time In: 1300  Time Out: [address], PT

## 2019-12-10 NOTE — PROGRESS NOTES
Problem: Self Care Deficits Care Plan (Adult)  Goal: *Acute Goals and Plan of Care (Insert Text)  Description  GOALS:   DISCHARGE GOALS (in preparation for going home/rehab):  3 days  1. Mr. Alverto Garay will perform one lower body dressing activity with SBA required to demonstrate improved functional mobility and safety. -GOAL MET 12/10/2019   2. Mr. Alverto Garay will perform one lower body bathing activity with SBA required to demonstrate improved functional mobility and safety. -GOAL MET 12/10/2019   3. Mr. Alverto Garay will perform toileting/toilet transfer with SBA to demonstrate improved functional mobility and safety. -GOAL MET 12/10/2019   4. Mr. Alverto Garay will perform shower transfer with SBA to demonstrate improved functional mobility and safety. -GOAL MET 12/10/2019         JOINT CAMP OCCUPATIONAL THERAPY TKA: Daily Note and Discharge 12/10/2019  INPATIENT: Hospital Day: 2  Payor: [facility] / Plan: [employer] MEDIC[employer] CHOICE PPO/PFFS / Product Type: Managed Care Medicare /      NAME/AGE/GENDER: Jeanette Betancur is a 62 y.o. male   PRIMARY DIAGNOSIS:  Primary osteoarthritis of right knee [M17.11]   Procedure(s) and Anesthesia Type:     * GEMINI/ RIGHT KNEE ARTHROPLASTY TOTAL / - General (Right)  ICD-10: Treatment Diagnosis:    · Pain in Right Knee (M25.561)  · Stiffness of Right Knee, Not elsewhere classified (X00.448)      ASSESSMENT:      Mr. Alverto Garay is s/p Right TKA and presents with decreased weight bearing on R LE and decreased independence with functional mobility and activities of daily living. Patient completed shower and dressing as charter below in ADL grid and is ambulating with rolling walker and supervision assist.  Patient has met 4/4 goals and plans to return home with good family support. Family able to provide patient with appropriate level of assistance at this time. OT reviewed safety precautions throughout session and therapy schedule for the remainder of today.    Will do well at home for self cares and transfers during ADL's.  D/C OT for acute deficits. This section established at most recent assessment   PROBLEM LIST (Impairments causing functional limitations):  1. Decreased Strength  2. Decreased ADL/Functional Activities  3. Decreased Transfer Abilities  4. Increased Pain  5. Increased Fatigue  6. Decreased Flexibility/Joint Mobility  7. Decreased Knowledge of Precautions   INTERVENTIONS PLANNED: (Benefits and precautions of occupational therapy have been discussed with the patient.)  1. Activities of daily living training  2. Adaptive equipment training  3. Balance training  4. Clothing management  5. Donning&doffing training  6. Theraputic activity     TREATMENT PLAN: Frequency/Duration: Follow patient 1-2tx to address above goals. Rehabilitation Potential For Stated Goals: Excellent     RECOMMENDED REHABILITATION/EQUIPMENT: (at time of discharge pending progress): Continue Skilled Therapy. OCCUPATIONAL PROFILE AND HISTORY:   History of Present Injury/Illness (Reason for Referral): Pt presents this date s/p (Right) TKA. Past Medical History/Comorbidities:   Mr. Angelica Parsons  has a past medical history of Arthritis, Bradycardia, Chronic pain, Kidney stones, and Status post total knee replacement, right (12/9/2019). Mr. Angelica Parsons  has a past surgical history that includes hx knee replacement (Left, 2016); hx knee arthroscopy (2014); hx other surgical (2000); and hx other surgical (2001). Social History/Living Environment:   Home Environment: Private residence  # Steps to Enter: 3  Rails to Enter: Yes  Hand Rails : Right  One/Two Story Residence: One story  Living Alone: No  Support Systems: Spouse/Significant Other/Partner  Patient Expects to be Discharged to[de-identified] Private residence  Current DME Used/Available at Home: Commode, bedside, Shower chair, Walker, rolling  Tub or Shower Type: Shower  Prior Level of Function/Work/Activity:  Independent prior.       Number of Personal Factors/Comorbidities that affect the Plan of Care: Brief history (0):  LOW COMPLEXITY   ASSESSMENT OF OCCUPATIONAL PERFORMANCE[de-identified]   Most Recent Physical Functioning:   Balance  Sitting: Intact                    Coordination  Fine Motor Skills-Upper: Left Intact; Right Intact  Gross Motor Skills-Upper: Left Intact; Right Intact         Mental Status  Neurologic State: Alert  Orientation Level: Oriented X4  Cognition: Appropriate decision making; Appropriate for age attention/concentration; Appropriate safety awareness                Basic ADLs (From Assessment) Complex ADLs (From Assessment)   Basic ADL  Feeding: Independent  Oral Facial Hygiene/Grooming: Setup  Bathing: Stand-by assistance  Type of Bath: Chlorhexidine (CHG), Full, Shower  Upper Body Dressing: Setup  Lower Body Dressing: Setup, Supervision  Toileting: Supervision     Grooming/Bathing/Dressing Activities of Daily Living   Grooming  Grooming Assistance: Independent     Upper Body Bathing  Bathing Assistance: Independent     Lower Body Bathing  Bathing Assistance: Set-up; Supervision     Upper Body Dressing Assistance  Dressing Assistance: Set-up Functional Transfers  Bathroom Mobility: Supervision/set up  Toilet Transfer : Supervision  Shower Transfer: Supervision   Lower Body Dressing Assistance  Dressing Assistance: Supervision;Set-up  Underpants: Supervision  Pants With Elastic Waist: Supervision  Socks: Supervision  Slip on Shoes with Back: Supervision Bed/Mat Mobility  Supine to Sit: Supervision  Sit to Stand: Independent  Stand to Sit: Supervision  Bed to Chair: Supervision  Scooting: Independent         Physical Skills Involved:  1. Range of Motion  2. Balance  3. Strength Cognitive Skills Affected (resulting in the inability to perform in a timely and safe manner):  1. [facility]  Psychosocial Skills Affected:  1.  WFL    Number of elements that affect the Plan of Care: 1-3:  LOW COMPLEXITY   CLINICAL DECISION MAKIN [facility] [place] [address] [address] AM-PAC 6 Clicks Daily Activity Inpatient Short Form  How much help from another person does the patient currently need. .. Total A Lot A Little None   1. Putting on and taking off regular lower body clothing? [] 1   [] 2   [x] 3   [] 4   2. Bathing (including washing, rinsing, drying)? [] 1   [] 2   [x] 3   [] 4   3. Toileting, which includes using toilet, bedpan or urinal?   [] 1   [] 2   [x] 3   [] 4   4. Putting on and taking off regular upper body clothing? [] 1   [] 2   [] 3   [x] 4   5. Taking care of personal grooming such as brushing teeth? [] 1   [] 2   [] 3   [x] 4   6. Eating meals? [] 1   [] 2   [] 3   [x] 4   © 2007, Trustees of [address] [place] [address], under license to [employer]. All rights reserved     Score:  Initial: 20 Most Recent: 21 (Date: 12/10/2019 )    Interpretation of Tool:  Represents activities that are increasingly more difficult (i.e. Bed mobility, Transfers, Gait). Medical Necessity:     · Skilled intervention continues to be required due to Deficits listed abvoe. Reason for Services/Other Comments:  · Patient continues to require skilled intervention due to   · New TKA   · . Use of outcome tool(s) and clinical judgement create a POC that gives a: MODERATE COMPLEXITY            TREATMENT:   (In addition to Assessment/Re-Assessment sessions the following treatments were rendered)     Pre-treatment Symptoms/Complaints:    Pain: Initial:   Pain Intensity 1: 5  Pain Location 1: Knee  Pain Orientation 1: Right  Post Session:  5     Self Care: (40): Procedure(s) (per grid) utilized to improve and/or restore self-care/home management as related to dressing, bathing, toileting and grooming. Required minimal verbal cueing to facilitate activities of daily living skills. Treatment/Session Assessment:     Response to Treatment:  Good, sitting up in recliner.     Education:  [] Home Exercises  [x] Fall Precautions  [] Hip Precautions [] Going Home Video  [x] Knee/Hip Prosthesis Review  [x] Walker Management/Safety [x] Adaptive Equipment as Needed       Interdisciplinary Collaboration:   o Physical Therapist  o Occupational Therapist  o Registered Nurse    After treatment position/precautions:   o Up in chair  o Bed/Chair-wheels locked  o Caregiver at bedside  o Call light within reach  o RN notified     Compliance with Program/Exercises: Compliant all of the time, Will assess as treatment progresses. Recommendations/Intent for next treatment session:  D/C OT for acute deficits.       Total Treatment Duration:  OT Patient Time In/Time Out  Time In: 7699  Time Out: Alexei 35, OT

## 2019-12-11 ENCOUNTER — HOME CARE VISIT (OUTPATIENT)
Dept: SCHEDULING | Facility: HOME HEALTH | Age: 54
End: 2019-12-11
Payer: MEDICARE

## 2019-12-11 VITALS
TEMPERATURE: 98.5 F | HEART RATE: 50 BPM | RESPIRATION RATE: 18 BRPM | SYSTOLIC BLOOD PRESSURE: 180 MMHG | DIASTOLIC BLOOD PRESSURE: 90 MMHG

## 2019-12-11 PROCEDURE — 400013 HH SOC

## 2019-12-11 PROCEDURE — G0151 HHCP-SERV OF PT,EA 15 MIN: HCPCS

## 2019-12-11 PROCEDURE — 3331090001 HH PPS REVENUE CREDIT

## 2019-12-11 PROCEDURE — 3331090002 HH PPS REVENUE DEBIT

## 2019-12-11 RX ORDER — OXYCODONE HYDROCHLORIDE 5 MG/1
5 TABLET ORAL
Qty: 42 TAB | Refills: 0 | Status: SHIPPED | OUTPATIENT
Start: 2019-12-11 | End: 2019-12-18

## 2019-12-12 ENCOUNTER — HOME CARE VISIT (OUTPATIENT)
Dept: SCHEDULING | Facility: HOME HEALTH | Age: 54
End: 2019-12-12
Payer: MEDICARE

## 2019-12-12 PROCEDURE — 3331090001 HH PPS REVENUE CREDIT

## 2019-12-12 PROCEDURE — 3331090002 HH PPS REVENUE DEBIT

## 2019-12-12 PROCEDURE — G0157 HHC PT ASSISTANT EA 15: HCPCS

## 2019-12-13 PROCEDURE — 3331090001 HH PPS REVENUE CREDIT

## 2019-12-13 PROCEDURE — 3331090002 HH PPS REVENUE DEBIT

## 2019-12-14 PROCEDURE — 3331090001 HH PPS REVENUE CREDIT

## 2019-12-14 PROCEDURE — 3331090002 HH PPS REVENUE DEBIT

## 2019-12-15 VITALS
SYSTOLIC BLOOD PRESSURE: 136 MMHG | RESPIRATION RATE: 18 BRPM | TEMPERATURE: 97.6 F | DIASTOLIC BLOOD PRESSURE: 74 MMHG | HEART RATE: 72 BPM

## 2019-12-15 PROCEDURE — 3331090001 HH PPS REVENUE CREDIT

## 2019-12-15 PROCEDURE — 3331090002 HH PPS REVENUE DEBIT

## 2019-12-16 ENCOUNTER — HOME CARE VISIT (OUTPATIENT)
Dept: SCHEDULING | Facility: HOME HEALTH | Age: 54
End: 2019-12-16
Payer: MEDICARE

## 2019-12-16 VITALS
DIASTOLIC BLOOD PRESSURE: 90 MMHG | RESPIRATION RATE: 18 BRPM | TEMPERATURE: 97.3 F | SYSTOLIC BLOOD PRESSURE: 120 MMHG | HEART RATE: 67 BPM

## 2019-12-16 PROCEDURE — 3331090001 HH PPS REVENUE CREDIT

## 2019-12-16 PROCEDURE — G0157 HHC PT ASSISTANT EA 15: HCPCS

## 2019-12-16 PROCEDURE — 3331090002 HH PPS REVENUE DEBIT

## 2019-12-17 PROCEDURE — 3331090002 HH PPS REVENUE DEBIT

## 2019-12-17 PROCEDURE — 3331090001 HH PPS REVENUE CREDIT

## 2019-12-18 ENCOUNTER — HOME CARE VISIT (OUTPATIENT)
Dept: SCHEDULING | Facility: HOME HEALTH | Age: 54
End: 2019-12-18
Payer: MEDICARE

## 2019-12-18 VITALS
HEART RATE: 68 BPM | RESPIRATION RATE: 18 BRPM | DIASTOLIC BLOOD PRESSURE: 95 MMHG | SYSTOLIC BLOOD PRESSURE: 155 MMHG | TEMPERATURE: 98 F

## 2019-12-18 PROCEDURE — 3331090002 HH PPS REVENUE DEBIT

## 2019-12-18 PROCEDURE — G0151 HHCP-SERV OF PT,EA 15 MIN: HCPCS

## 2019-12-18 PROCEDURE — 3331090001 HH PPS REVENUE CREDIT

## 2019-12-19 PROCEDURE — 3331090002 HH PPS REVENUE DEBIT

## 2019-12-19 PROCEDURE — 3331090001 HH PPS REVENUE CREDIT

## 2019-12-20 ENCOUNTER — HOME CARE VISIT (OUTPATIENT)
Dept: SCHEDULING | Facility: HOME HEALTH | Age: 54
End: 2019-12-20
Payer: MEDICARE

## 2019-12-20 PROCEDURE — G0157 HHC PT ASSISTANT EA 15: HCPCS

## 2019-12-20 PROCEDURE — 3331090001 HH PPS REVENUE CREDIT

## 2019-12-20 PROCEDURE — 3331090002 HH PPS REVENUE DEBIT

## 2019-12-21 PROCEDURE — 3331090002 HH PPS REVENUE DEBIT

## 2019-12-21 PROCEDURE — 3331090001 HH PPS REVENUE CREDIT

## 2019-12-22 VITALS
TEMPERATURE: 97.6 F | DIASTOLIC BLOOD PRESSURE: 86 MMHG | RESPIRATION RATE: 18 BRPM | SYSTOLIC BLOOD PRESSURE: 146 MMHG | HEART RATE: 73 BPM

## 2019-12-22 PROCEDURE — 3331090001 HH PPS REVENUE CREDIT

## 2019-12-22 PROCEDURE — 3331090002 HH PPS REVENUE DEBIT

## 2019-12-23 ENCOUNTER — HOME CARE VISIT (OUTPATIENT)
Dept: SCHEDULING | Facility: HOME HEALTH | Age: 54
End: 2019-12-23
Payer: MEDICARE

## 2019-12-23 PROCEDURE — 3331090002 HH PPS REVENUE DEBIT

## 2019-12-23 PROCEDURE — G0157 HHC PT ASSISTANT EA 15: HCPCS

## 2019-12-23 PROCEDURE — 3331090001 HH PPS REVENUE CREDIT

## 2019-12-24 PROCEDURE — 3331090002 HH PPS REVENUE DEBIT

## 2019-12-24 PROCEDURE — 3331090001 HH PPS REVENUE CREDIT

## 2019-12-25 PROCEDURE — 3331090001 HH PPS REVENUE CREDIT

## 2019-12-25 PROCEDURE — 3331090002 HH PPS REVENUE DEBIT

## 2019-12-26 ENCOUNTER — HOME CARE VISIT (OUTPATIENT)
Dept: SCHEDULING | Facility: HOME HEALTH | Age: 54
End: 2019-12-26
Payer: MEDICARE

## 2019-12-26 ENCOUNTER — HOME CARE VISIT (OUTPATIENT)
Dept: HOME HEALTH SERVICES | Facility: HOME HEALTH | Age: 54
End: 2019-12-26
Payer: MEDICARE

## 2019-12-26 PROCEDURE — 3331090001 HH PPS REVENUE CREDIT

## 2019-12-26 PROCEDURE — G0157 HHC PT ASSISTANT EA 15: HCPCS

## 2019-12-26 PROCEDURE — 3331090002 HH PPS REVENUE DEBIT

## 2019-12-27 ENCOUNTER — HOME CARE VISIT (OUTPATIENT)
Dept: SCHEDULING | Facility: HOME HEALTH | Age: 54
End: 2019-12-27
Payer: MEDICARE

## 2019-12-27 VITALS
RESPIRATION RATE: 16 BRPM | HEART RATE: 50 BPM | SYSTOLIC BLOOD PRESSURE: 138 MMHG | DIASTOLIC BLOOD PRESSURE: 86 MMHG | TEMPERATURE: 98.1 F

## 2019-12-27 VITALS
SYSTOLIC BLOOD PRESSURE: 142 MMHG | HEART RATE: 62 BPM | DIASTOLIC BLOOD PRESSURE: 87 MMHG | TEMPERATURE: 98.3 F | RESPIRATION RATE: 16 BRPM

## 2019-12-27 PROCEDURE — 3331090002 HH PPS REVENUE DEBIT

## 2019-12-27 PROCEDURE — 3331090001 HH PPS REVENUE CREDIT

## 2019-12-27 PROCEDURE — G0151 HHCP-SERV OF PT,EA 15 MIN: HCPCS

## 2019-12-28 PROCEDURE — 3331090002 HH PPS REVENUE DEBIT

## 2019-12-28 PROCEDURE — 3331090001 HH PPS REVENUE CREDIT

## 2019-12-29 PROCEDURE — 3331090001 HH PPS REVENUE CREDIT

## 2019-12-29 PROCEDURE — 3331090002 HH PPS REVENUE DEBIT

## 2019-12-30 VITALS
HEART RATE: 60 BPM | TEMPERATURE: 98.3 F | DIASTOLIC BLOOD PRESSURE: 76 MMHG | SYSTOLIC BLOOD PRESSURE: 122 MMHG | RESPIRATION RATE: 16 BRPM

## 2019-12-30 NOTE — PROGRESS NOTES
[facility] - INPATIENT  Face to Face Encounter    Patients Name: Lucita Erwin    YOB: 1965    Ordering Physician: Garry Timmons    Primary Diagnosis: Primary osteoarthritis of right knee [M17.11]  Osteoarthritis [M19.90]    Date of Face to Face:   12/09/19                                 Face to Face Encounter findings are related to primary reason for home care:   yes. 1. I certify that the patient needs intermittent care as follows: physical therapy: strengthening and gait/stair training    2. I certify that this patient is homebound, that is: 1) patient requires the use of a walker device, special transportation, or assistance of another to leave the home; or 2) patient's condition makes leaving the home medically contraindicated; and 3) patient has a normal inability to leave the home and leaving the home requires considerable and taxing effort. Patient may leave the home for infrequent and short duration for medical reasons, and occasional absences for non-medical reasons. Homebound status is due to the following functional limitations: Patient currently under activity restrictions secondary to recent surgical procedure, this hinders their ability to safely leave the home. 3. I certify that this patient is under my care and that I, or a nurse practitioner or  [id number], or clinical nurse specialist, or certified nurse midwife, working with me, had a Face-to-Face Encounter that meets the physician Face-to-Face Encounter requirements. The following are the clinical findings from the [address] [place] [address] encounter that support the need for skilled services and is a summary of the encounter:  See Progress Notes     See attached progess note      JACQUELINE Mejia  12/30/2019      THE FOLLOWING TO BE COMPLETED BY THE COMMUNITY PHYSICIAN:    I concur with the findings described above from the [facility] encounter that this patient is homebound and in need of a skilled service.     Certifying Physician: _____________________________________      Printed Certifying Physician Name: _____________________________________    Date: _________________

## 2020-03-03 PROBLEM — Z96.652 PRESENCE OF LEFT ARTIFICIAL KNEE JOINT: Status: ACTIVE | Noted: 2020-03-03

## 2020-03-03 PROBLEM — Z96.651 PRESENCE OF RIGHT ARTIFICIAL KNEE JOINT: Status: ACTIVE | Noted: 2020-03-03

## 2020-03-03 PROBLEM — M21.961 ACQUIRED DEFORMITY OF RIGHT KNEE: Status: ACTIVE | Noted: 2020-03-03

## (undated) DEVICE — DERMABOND SKIN ADH 0.7ML -- DERMABOND ADVANCED 12/BX

## (undated) DEVICE — BLADE SURG SAW STD S STL OSC W/ SERR EDGE DISP

## (undated) DEVICE — (D)PREP SKN CHLRAPRP APPL 26ML -- CONVERT TO ITEM 371833

## (undated) DEVICE — GAUZE,SPONGE,2"X2",8PLY,STERILE,LF,2'S: Brand: MEDLINE

## (undated) DEVICE — BANDAGE COMPR SELF ADH 5 YDX4 IN TAN STRL PREMIERPRO LF

## (undated) DEVICE — DRAPE,TOP,102X53,STERILE: Brand: MEDLINE

## (undated) DEVICE — DISPOSABLE DRAPE, STERILE, FOR A CDS-3060 5 FOOT TABLE: Brand: PEDIGO PRODUCTS, INC.

## (undated) DEVICE — Device

## (undated) DEVICE — X-LARGE COTTON GLOVE: Brand: DEROYAL

## (undated) DEVICE — SUTURE PDS II SZ 1 L96IN ABSRB VLT TP-1 L65MM 1/2 CIR Z880G

## (undated) DEVICE — BIPOLAR SEALER 23-112-1 AQM 6.0: Brand: AQUAMANTYS ®

## (undated) DEVICE — TOTAL KNEE DR JENNINGS: Brand: MEDLINE INDUSTRIES, INC.

## (undated) DEVICE — 3M™ TEGADERM™ TRANSPARENT FILM DRESSING FRAME STYLE, 1624W, 2-3/8 IN X 2-3/4 IN (6 CM X 7 CM), 100/CT 4CT/CASE: Brand: 3M™ TEGADERM™

## (undated) DEVICE — E-Z CLEAN, PTFE COATED, ELECTROSURGICAL LAPAROSCOPIC ELECTRODE, J-HOOK, 33 CM., SINGLE-USE, FOR USE WITH HAND CONTROL PENCIL: Brand: MEGADYNE

## (undated) DEVICE — SYR 50ML LR LCK 1ML GRAD NSAF --

## (undated) DEVICE — STERILE PRESSURE PROTECTOR PAD® FOR DE MAYO UNIVERSAL DISTRACTOR® (10/CASE): Brand: DE MAYO UNIVERSAL DISTRACTOR®

## (undated) DEVICE — TRAY PREP DRY W/ PREM GLV 2 APPL 6 SPNG 2 UNDPD 1 OVERWRAP

## (undated) DEVICE — KIT DRP FOR RIO ROBOTIC ARM ASST SYS

## (undated) DEVICE — SUTURE VCRL SZ 1 L27IN ABSRB UD L36MM CP-1 1/2 CIR REV CUT J268H

## (undated) DEVICE — BLADE SURG SAW S STL NAR OSC W/ SERR EDGE DISP

## (undated) DEVICE — PIN BNE FIX L110MM DIA32MM

## (undated) DEVICE — PIN BNE FIX 4X140MM STRL -- 1EA=2PK MAKO

## (undated) DEVICE — Z DISCONTINUED USE 2744636  DRESSING AQUACEL 14 IN ALG W3.5XL14IN POLYUR FLM CVR W/ HYDRCOLL

## (undated) DEVICE — SOLUTION IRRIG 3000ML 0.9% SOD CHL FLX CONT 0797208] ICU MEDICAL INC]

## (undated) DEVICE — SUTURE ABSRB X-1 REV CUT 1/2 CIR 22MM UD BRAID 27IN SZ 3-0 J458H

## (undated) DEVICE — KIT INT FIX FEM TIB CKPT MAKOPLASTY

## (undated) DEVICE — SOLUTION IV 1000ML 0.9% SOD CHL

## (undated) DEVICE — T4 HOOD

## (undated) DEVICE — SUTURE 2 0 STRATAFIX SYMMETRIC PDS + 60CM CT 1 SXPP1A439

## (undated) DEVICE — HANDPIECE SET WITH COAXIAL HIGH FLOW TIP AND SUCTION TUBE: Brand: INTERPULSE

## (undated) DEVICE — CORD RETRCT SIL

## (undated) DEVICE — KIT PROC KNE TRACKING PK/1 -- VIZADISC MAKO

## (undated) DEVICE — BLADE SAW PAT RMR PILT H 46MM --

## (undated) DEVICE — SUT ETHBND 2 30IN LR DA GRN --

## (undated) DEVICE — SOLUTION IV DEXTROSE/SALINE 5%-0.9% 500ML - 500ML